# Patient Record
Sex: FEMALE | Race: WHITE | Employment: OTHER | URBAN - METROPOLITAN AREA
[De-identification: names, ages, dates, MRNs, and addresses within clinical notes are randomized per-mention and may not be internally consistent; named-entity substitution may affect disease eponyms.]

---

## 2017-02-14 RX ORDER — QUINAPRIL 10 MG/1
TABLET ORAL
Qty: 30 TABLET | Refills: 5 | Status: SHIPPED | OUTPATIENT
Start: 2017-02-14 | End: 2017-08-28

## 2017-02-14 RX ORDER — LEVOTHYROXINE SODIUM 100 MCG
TABLET ORAL
Qty: 30 TABLET | Refills: 1 | Status: SHIPPED | OUTPATIENT
Start: 2017-02-14 | End: 2017-04-14

## 2017-04-07 ENCOUNTER — OFFICE VISIT (OUTPATIENT)
Dept: FAMILY MEDICINE CLINIC | Facility: CLINIC | Age: 77
End: 2017-04-07

## 2017-04-07 ENCOUNTER — APPOINTMENT (OUTPATIENT)
Dept: LAB | Age: 77
End: 2017-04-07
Attending: FAMILY MEDICINE
Payer: MEDICARE

## 2017-04-07 VITALS
DIASTOLIC BLOOD PRESSURE: 81 MMHG | TEMPERATURE: 98 F | WEIGHT: 173 LBS | BODY MASS INDEX: 35 KG/M2 | HEART RATE: 74 BPM | SYSTOLIC BLOOD PRESSURE: 130 MMHG

## 2017-04-07 DIAGNOSIS — E03.8 HYPOTHYROIDISM DUE TO HASHIMOTO'S THYROIDITIS: ICD-10-CM

## 2017-04-07 DIAGNOSIS — E03.8 HYPOTHYROIDISM DUE TO HASHIMOTO'S THYROIDITIS: Primary | ICD-10-CM

## 2017-04-07 DIAGNOSIS — G89.29 CHRONIC BILATERAL LOW BACK PAIN WITHOUT SCIATICA: ICD-10-CM

## 2017-04-07 DIAGNOSIS — E06.3 HYPOTHYROIDISM DUE TO HASHIMOTO'S THYROIDITIS: ICD-10-CM

## 2017-04-07 DIAGNOSIS — E06.3 HYPOTHYROIDISM DUE TO HASHIMOTO'S THYROIDITIS: Primary | ICD-10-CM

## 2017-04-07 DIAGNOSIS — M54.50 CHRONIC BILATERAL LOW BACK PAIN WITHOUT SCIATICA: ICD-10-CM

## 2017-04-07 PROCEDURE — 99214 OFFICE O/P EST MOD 30 MIN: CPT | Performed by: FAMILY MEDICINE

## 2017-04-07 PROCEDURE — G0463 HOSPITAL OUTPT CLINIC VISIT: HCPCS | Performed by: FAMILY MEDICINE

## 2017-04-07 PROCEDURE — 84439 ASSAY OF FREE THYROXINE: CPT

## 2017-04-07 PROCEDURE — 36415 COLL VENOUS BLD VENIPUNCTURE: CPT

## 2017-04-07 PROCEDURE — 84443 ASSAY THYROID STIM HORMONE: CPT

## 2017-04-07 RX ORDER — OXYBUTYNIN CHLORIDE 10 MG/1
10 TABLET, EXTENDED RELEASE ORAL DAILY
Qty: 30 TABLET | Refills: 5 | Status: SHIPPED | OUTPATIENT
Start: 2017-04-07 | End: 2018-02-26

## 2017-04-07 NOTE — PROGRESS NOTES
HPI:    Patient ID: Yuliana Wyatt is a 78year old female. HPI  Patient presents with:  Thyroid Problem: f/u  Incontinence  Low back pain chronic  Review of Systems   Constitutional: Negative. Respiratory: Negative. Cardiovascular: Negative. Referrals:  None       NO#[id number]

## 2017-04-11 ENCOUNTER — TELEPHONE (OUTPATIENT)
Dept: FAMILY MEDICINE CLINIC | Facility: CLINIC | Age: 77
End: 2017-04-11

## 2017-04-11 NOTE — TELEPHONE ENCOUNTER
Dr. Keke Bains please call patient again, she states she was in the bathroom/shower, when you called

## 2017-04-12 NOTE — TELEPHONE ENCOUNTER
LMTCB please transfer to [phone] until 5 and [phone] after. Notes Recorded by ALKA Burdick on 4/11/2017 at 9:39 AM  Left message with patient regarding normal results and advised continue same dose of Synthroid.

## 2017-04-14 RX ORDER — LEVOTHYROXINE SODIUM 100 MCG
TABLET ORAL
Qty: 30 TABLET | Refills: 2 | Status: SHIPPED | OUTPATIENT
Start: 2017-04-14 | End: 2017-08-07

## 2017-04-14 NOTE — TELEPHONE ENCOUNTER
Rx request for Synthroid 100 mcg, PASSED Hypothyroid Protocol. Rx filled per protocol.     Hypothyroid Medications  Protocol Criteria:  Appointment scheduled in the past 12 months or the next 3 months  TSH resulted in the past 12 months that is normal  Rece

## 2017-05-10 ENCOUNTER — OFFICE VISIT (OUTPATIENT)
Dept: FAMILY MEDICINE CLINIC | Facility: CLINIC | Age: 77
End: 2017-05-10

## 2017-05-10 VITALS
SYSTOLIC BLOOD PRESSURE: 127 MMHG | RESPIRATION RATE: 16 BRPM | HEART RATE: 75 BPM | DIASTOLIC BLOOD PRESSURE: 81 MMHG | TEMPERATURE: 98 F

## 2017-05-10 DIAGNOSIS — J06.9 ACUTE URI: Primary | ICD-10-CM

## 2017-05-10 PROCEDURE — 99213 OFFICE O/P EST LOW 20 MIN: CPT | Performed by: FAMILY MEDICINE

## 2017-05-10 PROCEDURE — G0463 HOSPITAL OUTPT CLINIC VISIT: HCPCS | Performed by: FAMILY MEDICINE

## 2017-05-10 RX ORDER — AMOXICILLIN 875 MG/1
875 TABLET, COATED ORAL 2 TIMES DAILY
Qty: 20 TABLET | Refills: 0 | Status: SHIPPED | OUTPATIENT
Start: 2017-05-10 | End: 2017-06-26 | Stop reason: ALTCHOICE

## 2017-05-10 NOTE — PROGRESS NOTES
HPI:    Patient ID: Inocencio Vo is a 78year old female. HPI Comments: Pt presents with cold symptoms for 2-3 days. Pt has had cough, sore throat. No fevers. Pt has tried otc remedies without relief. Pt states no sick contacts.        Chest Congest canal normal.   Left Ear: Tympanic membrane and ear canal normal.   Mouth/Throat: Oropharynx is clear and moist.   Neck: Neck supple. Cardiovascular: Normal rate, regular rhythm, normal heart sounds and intact distal pulses.     Pulmonary/Chest: Effort no

## 2017-06-26 ENCOUNTER — TELEPHONE (OUTPATIENT)
Dept: INTERNAL MEDICINE CLINIC | Facility: CLINIC | Age: 77
End: 2017-06-26

## 2017-06-26 ENCOUNTER — OFFICE VISIT (OUTPATIENT)
Dept: FAMILY MEDICINE CLINIC | Facility: CLINIC | Age: 77
End: 2017-06-26

## 2017-06-26 VITALS
DIASTOLIC BLOOD PRESSURE: 81 MMHG | BODY MASS INDEX: 35 KG/M2 | WEIGHT: 173 LBS | SYSTOLIC BLOOD PRESSURE: 121 MMHG | TEMPERATURE: 98 F

## 2017-06-26 DIAGNOSIS — M17.0 PRIMARY OSTEOARTHRITIS OF BOTH KNEES: Primary | ICD-10-CM

## 2017-06-26 PROCEDURE — G0463 HOSPITAL OUTPT CLINIC VISIT: HCPCS | Performed by: FAMILY MEDICINE

## 2017-06-26 PROCEDURE — 99213 OFFICE O/P EST LOW 20 MIN: CPT | Performed by: FAMILY MEDICINE

## 2017-06-26 NOTE — PROGRESS NOTES
HPI:    Patient ID: Orion Howell is a 78year old female. HPI  Patient presents with:  Fall: pt rolled out of bed and fell on floor, c/o knee pain and back  Has two knee replacements the right one done six months ago.   Review of Systems   Constitut

## 2017-06-26 NOTE — TELEPHONE ENCOUNTER
Actions Requested: appt today fro eval.  Scheduled   Situation/Background   Problem: fell out of bed and had to put full pressure on knees ( new bilateral knee replacements< 6 months ago), denies LOC or hitting head   Onset: < 1hour ago   Associated Sympto

## 2017-08-07 ENCOUNTER — OFFICE VISIT (OUTPATIENT)
Dept: FAMILY MEDICINE CLINIC | Facility: CLINIC | Age: 77
End: 2017-08-07

## 2017-08-07 VITALS
WEIGHT: 173 LBS | BODY MASS INDEX: 35 KG/M2 | DIASTOLIC BLOOD PRESSURE: 84 MMHG | SYSTOLIC BLOOD PRESSURE: 136 MMHG | HEART RATE: 60 BPM

## 2017-08-07 DIAGNOSIS — E03.8 HYPOTHYROIDISM DUE TO HASHIMOTO'S THYROIDITIS: ICD-10-CM

## 2017-08-07 DIAGNOSIS — L84 PRE-ULCERATIVE CORN OR CALLOUS: Primary | ICD-10-CM

## 2017-08-07 DIAGNOSIS — N39.41 URGE INCONTINENCE OF URINE: ICD-10-CM

## 2017-08-07 DIAGNOSIS — R41.840 POOR CONCENTRATION: ICD-10-CM

## 2017-08-07 DIAGNOSIS — E06.3 HYPOTHYROIDISM DUE TO HASHIMOTO'S THYROIDITIS: ICD-10-CM

## 2017-08-07 PROCEDURE — G0463 HOSPITAL OUTPT CLINIC VISIT: HCPCS | Performed by: FAMILY MEDICINE

## 2017-08-07 PROCEDURE — 99214 OFFICE O/P EST MOD 30 MIN: CPT | Performed by: FAMILY MEDICINE

## 2017-08-07 RX ORDER — LEVOTHYROXINE SODIUM 0.1 MG/1
100 TABLET ORAL
Qty: 30 TABLET | Refills: 10 | Status: SHIPPED | OUTPATIENT
Start: 2017-08-07 | End: 2018-07-30

## 2017-08-07 NOTE — PROGRESS NOTES
HPI:    Patient ID: Fanny Pereyra is a 78year old female.     HPI  Patient presents with:  Callus: callus on right foot toe  Memory Loss: pt has concern on memory  Medication Problem: pt has questions on Oxybutynin medication    Review of Systems   Con she stopped in the past. Her memory is concerned for word searching at times. She is anxious and busy with multiple personal issues. I feel her concentration is poor due to this more than an element of dementia.     No orders of the defined types were place

## 2017-08-30 RX ORDER — QUINAPRIL 10 MG/1
TABLET ORAL
Qty: 30 TABLET | Refills: 2 | Status: SHIPPED | OUTPATIENT
Start: 2017-08-30 | End: 2017-12-14

## 2017-10-30 ENCOUNTER — NURSE TRIAGE (OUTPATIENT)
Dept: FAMILY MEDICINE CLINIC | Facility: CLINIC | Age: 77
End: 2017-10-30

## 2017-10-30 NOTE — TELEPHONE ENCOUNTER
Action Requested: Summary for Provider     []  Critical Lab, Recommendations Needed  [] Need Additional Advice  [x]   FYI    []   Need Orders  [] Need Medications Sent to Pharmacy  []  Other     SUMMARY: FYI, patient fell out of bed, no injury    Pt states

## 2017-10-30 NOTE — TELEPHONE ENCOUNTER
Patient returning a call and advised about Dr Priya Brannon understanding. Note      Noted. Follow up if keeps occurring.

## 2017-12-15 RX ORDER — QUINAPRIL 10 MG/1
TABLET ORAL
Qty: 30 TABLET | Refills: 2 | Status: SHIPPED | OUTPATIENT
Start: 2017-12-15 | End: 2018-02-26

## 2018-02-26 ENCOUNTER — OFFICE VISIT (OUTPATIENT)
Dept: FAMILY MEDICINE CLINIC | Facility: CLINIC | Age: 78
End: 2018-02-26

## 2018-02-26 ENCOUNTER — LAB ENCOUNTER (OUTPATIENT)
Dept: LAB | Age: 78
End: 2018-02-26
Attending: FAMILY MEDICINE
Payer: MEDICARE

## 2018-02-26 VITALS
HEIGHT: 60.5 IN | SYSTOLIC BLOOD PRESSURE: 118 MMHG | WEIGHT: 181.5 LBS | TEMPERATURE: 98 F | HEART RATE: 88 BPM | DIASTOLIC BLOOD PRESSURE: 83 MMHG | BODY MASS INDEX: 34.71 KG/M2

## 2018-02-26 DIAGNOSIS — F41.9 ANXIETY: ICD-10-CM

## 2018-02-26 DIAGNOSIS — I10 ESSENTIAL HYPERTENSION: Primary | ICD-10-CM

## 2018-02-26 DIAGNOSIS — Z23 NEED FOR VACCINATION: ICD-10-CM

## 2018-02-26 DIAGNOSIS — N39.46 MIXED STRESS AND URGE URINARY INCONTINENCE: ICD-10-CM

## 2018-02-26 DIAGNOSIS — M48.061 SPINAL STENOSIS OF LUMBAR REGION WITHOUT NEUROGENIC CLAUDICATION: ICD-10-CM

## 2018-02-26 DIAGNOSIS — M43.16 SPONDYLOLISTHESIS OF LUMBAR REGION: ICD-10-CM

## 2018-02-26 DIAGNOSIS — E07.9 THYROID DISEASE: ICD-10-CM

## 2018-02-26 DIAGNOSIS — Z00.00 ENCOUNTER FOR ANNUAL HEALTH EXAMINATION: ICD-10-CM

## 2018-02-26 DIAGNOSIS — I10 ESSENTIAL HYPERTENSION: ICD-10-CM

## 2018-02-26 PROBLEM — A04.8 H. PYLORI INFECTION: Status: RESOLVED | Noted: 2018-02-26 | Resolved: 2018-02-26

## 2018-02-26 PROBLEM — K63.5 COLON POLYP: Status: RESOLVED | Noted: 2018-02-26 | Resolved: 2018-02-26

## 2018-02-26 PROBLEM — M53.9 BACK PROBLEM: Status: RESOLVED | Noted: 2018-02-26 | Resolved: 2018-02-26

## 2018-02-26 PROBLEM — M54.9 BACK PAIN: Status: RESOLVED | Noted: 2018-02-26 | Resolved: 2018-02-26

## 2018-02-26 LAB
ALBUMIN SERPL BCP-MCNC: 4.1 G/DL (ref 3.5–4.8)
ALBUMIN/GLOB SERPL: 1.2 {RATIO} (ref 1–2)
ALP SERPL-CCNC: 100 U/L (ref 32–100)
ALT SERPL-CCNC: 23 U/L (ref 14–54)
ANION GAP SERPL CALC-SCNC: 11 MMOL/L (ref 0–18)
AST SERPL-CCNC: 29 U/L (ref 15–41)
BACTERIA UR QL AUTO: NEGATIVE /HPF
BASOPHILS # BLD: 0.1 K/UL (ref 0–0.2)
BASOPHILS NFR BLD: 1 %
BILIRUB SERPL-MCNC: 1 MG/DL (ref 0.3–1.2)
BILIRUB UR QL: NEGATIVE
BUN SERPL-MCNC: 11 MG/DL (ref 8–20)
BUN/CREAT SERPL: 14.7 (ref 10–20)
CALCIUM SERPL-MCNC: 9.7 MG/DL (ref 8.5–10.5)
CHLORIDE SERPL-SCNC: 103 MMOL/L (ref 95–110)
CHOLEST SERPL-MCNC: 206 MG/DL (ref 110–200)
CO2 SERPL-SCNC: 24 MMOL/L (ref 22–32)
COLOR UR: YELLOW
CREAT SERPL-MCNC: 0.75 MG/DL (ref 0.5–1.5)
EOSINOPHIL # BLD: 0.1 K/UL (ref 0–0.7)
EOSINOPHIL NFR BLD: 2 %
ERYTHROCYTE [DISTWIDTH] IN BLOOD BY AUTOMATED COUNT: 13.5 % (ref 11–15)
GLOBULIN PLAS-MCNC: 3.4 G/DL (ref 2.5–3.7)
GLUCOSE SERPL-MCNC: 148 MG/DL (ref 70–99)
GLUCOSE UR-MCNC: NEGATIVE MG/DL
HCT VFR BLD AUTO: 44.8 % (ref 35–48)
HDLC SERPL-MCNC: 53 MG/DL
HGB BLD-MCNC: 15.2 G/DL (ref 12–16)
KETONES UR-MCNC: NEGATIVE MG/DL
LDLC SERPL CALC-MCNC: 118 MG/DL (ref 0–99)
LYMPHOCYTES # BLD: 2 K/UL (ref 1–4)
LYMPHOCYTES NFR BLD: 25 %
MCH RBC QN AUTO: 31 PG (ref 27–32)
MCHC RBC AUTO-ENTMCNC: 34 G/DL (ref 32–37)
MCV RBC AUTO: 91.1 FL (ref 80–100)
MONOCYTES # BLD: 0.5 K/UL (ref 0–1)
MONOCYTES NFR BLD: 6 %
NEUTROPHILS # BLD AUTO: 5.5 K/UL (ref 1.8–7.7)
NEUTROPHILS NFR BLD: 67 %
NITRITE UR QL STRIP.AUTO: NEGATIVE
NONHDLC SERPL-MCNC: 153 MG/DL
OSMOLALITY UR CALC.SUM OF ELEC: 288 MOSM/KG (ref 275–295)
PATIENT FASTING: YES
PH UR: 5 [PH] (ref 5–8)
PLATELET # BLD AUTO: 224 K/UL (ref 140–400)
PMV BLD AUTO: 9 FL (ref 7.4–10.3)
POTASSIUM SERPL-SCNC: 4.2 MMOL/L (ref 3.3–5.1)
PROT SERPL-MCNC: 7.5 G/DL (ref 5.9–8.4)
PROT UR-MCNC: NEGATIVE MG/DL
RBC # BLD AUTO: 4.92 M/UL (ref 3.7–5.4)
RBC #/AREA URNS AUTO: 2 /HPF
SODIUM SERPL-SCNC: 138 MMOL/L (ref 136–144)
SP GR UR STRIP: 1.02 (ref 1–1.03)
TRIGL SERPL-MCNC: 175 MG/DL (ref 1–149)
TSH SERPL-ACNC: 2.39 UIU/ML (ref 0.45–5.33)
UROBILINOGEN UR STRIP-ACNC: 2
VIT C UR-MCNC: NEGATIVE MG/DL
WBC # BLD AUTO: 8.3 K/UL (ref 4–11)
WBC #/AREA URNS AUTO: 4 /HPF

## 2018-02-26 PROCEDURE — G0439 PPPS, SUBSEQ VISIT: HCPCS | Performed by: FAMILY MEDICINE

## 2018-02-26 PROCEDURE — G0009 ADMIN PNEUMOCOCCAL VACCINE: HCPCS | Performed by: FAMILY MEDICINE

## 2018-02-26 PROCEDURE — 84443 ASSAY THYROID STIM HORMONE: CPT

## 2018-02-26 PROCEDURE — 81001 URINALYSIS AUTO W/SCOPE: CPT

## 2018-02-26 PROCEDURE — 80053 COMPREHEN METABOLIC PANEL: CPT

## 2018-02-26 PROCEDURE — 36415 COLL VENOUS BLD VENIPUNCTURE: CPT

## 2018-02-26 PROCEDURE — 80061 LIPID PANEL: CPT

## 2018-02-26 PROCEDURE — 90732 PPSV23 VACC 2 YRS+ SUBQ/IM: CPT | Performed by: FAMILY MEDICINE

## 2018-02-26 PROCEDURE — 85025 COMPLETE CBC W/AUTO DIFF WBC: CPT

## 2018-02-26 RX ORDER — CITALOPRAM 10 MG/1
10 TABLET ORAL DAILY
Qty: 15 TABLET | Refills: 0 | Status: SHIPPED | OUTPATIENT
Start: 2018-02-26 | End: 2018-09-04 | Stop reason: ALTCHOICE

## 2018-02-26 RX ORDER — OXYBUTYNIN CHLORIDE 10 MG/1
10 TABLET, EXTENDED RELEASE ORAL DAILY
Qty: 30 TABLET | Refills: 11 | Status: SHIPPED | OUTPATIENT
Start: 2018-02-26 | End: 2019-02-28

## 2018-02-26 RX ORDER — QUINAPRIL 10 MG/1
10 TABLET ORAL
Qty: 30 TABLET | Refills: 11 | Status: SHIPPED | OUTPATIENT
Start: 2018-02-26 | End: 2019-02-28

## 2018-02-26 NOTE — PROGRESS NOTES
HPI:   Thuy Olmedo is a 78year old female who presents for a Medicare Subsequent Annual Wellness visit (Pt already had Initial Annual Wellness).     Patient presents with:  Physical: Medicare Physical      Her last annual assessment has been over 1 Smoker                                                              Packs/day: 0.00      Years: 0.00         Quit date: 7/29/1966  Smokeless tobacco: Never Used                             CAGE Alcohol screening   Butch Flow was screened for Alcohol Thyroid disease; and Ulcer of the stomach and intestine.     She  has a past surgical history that includes ; injection, anesthetic/steroid, transforaminal epidural; lumbar/sacral, single level (Bilateral, 2015); injection, anesthetic/steroid, tra or asthma     EXAM:   /83 (BP Location: Left arm, Patient Position: Sitting, Cuff Size: large)   Pulse 88   Temp 97.7 °F (36.5 °C) (Oral)   Ht 5' 0.5\" (1.537 m)   Wt 181 lb 8 oz (82.3 kg)   LMP 08/28/1970 (LMP Unknown)   BMI 34.86 kg/m²  Estimated b well-developed and well-nourished. HENT:   Head: Normocephalic and atraumatic.    Right Ear: Tympanic membrane normal.   Left Ear: Tympanic membrane normal.   Nose: Nose normal.   Eyes: Conjunctivae and EOM are normal. Pupils are equal, round, and reactiv exercise. Return in 1 year (on 2/26/2019).      Annia Martell DO, 2/26/2018     General Health     In the past six months, have you lost more than 10 pounds without trying?: 2 - No  Has your appetite been poor?: No  How does the patient maintain a goo Pap+HPV every 5 yrs age 33-67, age 72 and older at high risk There are no preventive care reminders to display for this patient.  Update Health Maintenance if applicable    Chlamydia  Annually if high risk No results found for: CHLAMYDIA No flowsheet data f CREATININE (P) (mg/dL)   Date Value   09/20/2016 0.74    No flowsheet data found. Drug Serum Conc  Annually No results found for: DIGOXIN, DIG, VALP No flowsheet data found.                Template: [facility] AMB MEDICARE ANNUAL ASSESSMENT FEMALE Luz Maria Bran

## 2018-02-26 NOTE — PATIENT INSTRUCTIONS
Juan Lao's SCREENING SCHEDULE   Tests on this list are recommended by your physician but may not be covered, or covered at this frequency, by your insurer. Please check with your insurance carrier before scheduling to verify coverage.    Kourtney Daugherty who meet one of the following criteria:   • Men who are 73-68 years old and have smoked more than 100 cigarettes in their lifetime   • Anyone with a family history    Colorectal Cancer Screening  Covered up to Age 76     Colonoscopy Screen   Covered every Immunizations      Influenza  Covered Annually No orders found for this or any previous visit. Please get every year    Pneumococcal 13 (Prevnar)  Covered Once after 65 No orders found for this or any previous visit.  Please get once after your 65th birth

## 2018-03-03 ENCOUNTER — APPOINTMENT (OUTPATIENT)
Dept: GENERAL RADIOLOGY | Facility: HOSPITAL | Age: 78
End: 2018-03-03
Attending: EMERGENCY MEDICINE
Payer: MEDICARE

## 2018-03-03 ENCOUNTER — HOSPITAL ENCOUNTER (OUTPATIENT)
Facility: HOSPITAL | Age: 78
Setting detail: OBSERVATION
LOS: 1 days | Discharge: HOME | End: 2018-03-04
Attending: EMERGENCY MEDICINE | Admitting: INTERNAL MEDICINE
Payer: MEDICARE

## 2018-03-03 ENCOUNTER — APPOINTMENT (OUTPATIENT)
Dept: CT IMAGING | Facility: HOSPITAL | Age: 78
End: 2018-03-03
Attending: EMERGENCY MEDICINE
Payer: MEDICARE

## 2018-03-03 DIAGNOSIS — R26.81 UNSTEADY GAIT: ICD-10-CM

## 2018-03-03 DIAGNOSIS — E86.0 DEHYDRATION: Primary | ICD-10-CM

## 2018-03-03 LAB
ALBUMIN SERPL-MCNC: 3.5 G/DL (ref 3.5–4.8)
ALP LIVER SERPL-CCNC: 121 U/L (ref 55–142)
ALT SERPL-CCNC: 23 U/L (ref 14–54)
AST SERPL-CCNC: 21 U/L (ref 15–41)
ATRIAL RATE: 71 BPM
BASOPHILS # BLD AUTO: 0.08 X10(3) UL (ref 0–0.1)
BASOPHILS NFR BLD AUTO: 1.1 %
BILIRUB SERPL-MCNC: 0.5 MG/DL (ref 0.1–2)
BILIRUB UR QL STRIP.AUTO: NEGATIVE
BUN BLD-MCNC: 16 MG/DL (ref 8–20)
CALCIUM BLD-MCNC: 9.4 MG/DL (ref 8.3–10.3)
CHLORIDE: 109 MMOL/L (ref 101–111)
CLARITY UR REFRACT.AUTO: CLEAR
CO2: 22 MMOL/L (ref 22–32)
CREAT BLD-MCNC: 0.81 MG/DL (ref 0.55–1.02)
EOSINOPHIL # BLD AUTO: 0.2 X10(3) UL (ref 0–0.3)
EOSINOPHIL NFR BLD AUTO: 2.6 %
ERYTHROCYTE [DISTWIDTH] IN BLOOD BY AUTOMATED COUNT: 12.9 % (ref 11.5–16)
GLUCOSE BLD-MCNC: 153 MG/DL (ref 70–99)
GLUCOSE BLD-MCNC: 157 MG/DL (ref 65–99)
GLUCOSE UR STRIP.AUTO-MCNC: NEGATIVE MG/DL
HCT VFR BLD AUTO: 43.5 % (ref 34–50)
HGB BLD-MCNC: 14.7 G/DL (ref 12–16)
IMMATURE GRANULOCYTE COUNT: 0.03 X10(3) UL (ref 0–1)
IMMATURE GRANULOCYTE RATIO %: 0.4 %
KETONES UR STRIP.AUTO-MCNC: NEGATIVE MG/DL
LEUKOCYTE ESTERASE UR QL STRIP.AUTO: NEGATIVE
LYMPHOCYTES # BLD AUTO: 2.04 X10(3) UL (ref 0.9–4)
LYMPHOCYTES NFR BLD AUTO: 26.8 %
M PROTEIN MFR SERPL ELPH: 7.5 G/DL (ref 6.1–8.3)
MCH RBC QN AUTO: 30.5 PG (ref 27–33.2)
MCHC RBC AUTO-ENTMCNC: 33.8 G/DL (ref 31–37)
MCV RBC AUTO: 90.2 FL (ref 81–100)
MONOCYTES # BLD AUTO: 0.65 X10(3) UL (ref 0.1–1)
MONOCYTES NFR BLD AUTO: 8.6 %
NEUTROPHIL ABS PRELIM: 4.6 X10 (3) UL (ref 1.3–6.7)
NEUTROPHILS # BLD AUTO: 4.6 X10(3) UL (ref 1.3–6.7)
NEUTROPHILS NFR BLD AUTO: 60.5 %
NITRITE UR QL STRIP.AUTO: NEGATIVE
P AXIS: 20 DEGREES
P-R INTERVAL: 180 MS
PH UR STRIP.AUTO: 6 [PH] (ref 4.5–8)
PLATELET # BLD AUTO: 198 10(3)UL (ref 150–450)
POTASSIUM SERPL-SCNC: 4.1 MMOL/L (ref 3.6–5.1)
PROT UR STRIP.AUTO-MCNC: NEGATIVE MG/DL
Q-T INTERVAL: 388 MS
QRS DURATION: 86 MS
QTC CALCULATION (BEZET): 421 MS
R AXIS: -40 DEGREES
RBC # BLD AUTO: 4.82 X10(6)UL (ref 3.8–5.1)
RBC UR QL AUTO: NEGATIVE
RED CELL DISTRIBUTION WIDTH-SD: 42.3 FL (ref 35.1–46.3)
SODIUM SERPL-SCNC: 141 MMOL/L (ref 136–144)
SP GR UR STRIP.AUTO: <1.005 (ref 1–1.03)
T AXIS: 40 DEGREES
TROPONIN: <0.046 NG/ML (ref ?–0.05)
UROBILINOGEN UR STRIP.AUTO-MCNC: <2 MG/DL
VENTRICULAR RATE: 71 BPM
WBC # BLD AUTO: 7.6 X10(3) UL (ref 4–13)

## 2018-03-03 PROCEDURE — 70450 CT HEAD/BRAIN W/O DYE: CPT | Performed by: EMERGENCY MEDICINE

## 2018-03-03 PROCEDURE — 71045 X-RAY EXAM CHEST 1 VIEW: CPT | Performed by: EMERGENCY MEDICINE

## 2018-03-03 PROCEDURE — 99219 INITIAL OBSERVATION CARE,LEVL II: CPT | Performed by: INTERNAL MEDICINE

## 2018-03-03 RX ORDER — HEPARIN SODIUM 5000 [USP'U]/ML
5000 INJECTION, SOLUTION INTRAVENOUS; SUBCUTANEOUS EVERY 8 HOURS SCHEDULED
Status: DISCONTINUED | OUTPATIENT
Start: 2018-03-03 | End: 2018-03-04

## 2018-03-03 RX ORDER — SODIUM CHLORIDE 9 MG/ML
125 INJECTION, SOLUTION INTRAVENOUS CONTINUOUS
Status: DISCONTINUED | OUTPATIENT
Start: 2018-03-03 | End: 2018-03-03

## 2018-03-03 RX ORDER — ESCITALOPRAM OXALATE 5 MG/1
5 TABLET ORAL DAILY
Status: DISCONTINUED | OUTPATIENT
Start: 2018-03-03 | End: 2018-03-04

## 2018-03-03 RX ORDER — OXYBUTYNIN CHLORIDE 10 MG/1
10 TABLET, EXTENDED RELEASE ORAL DAILY
Status: DISCONTINUED | OUTPATIENT
Start: 2018-03-03 | End: 2018-03-04

## 2018-03-03 RX ORDER — LISINOPRIL 10 MG/1
10 TABLET ORAL DAILY
Status: DISCONTINUED | OUTPATIENT
Start: 2018-03-03 | End: 2018-03-04

## 2018-03-03 RX ORDER — SODIUM CHLORIDE 9 MG/ML
INJECTION, SOLUTION INTRAVENOUS CONTINUOUS
Status: DISCONTINUED | OUTPATIENT
Start: 2018-03-03 | End: 2018-03-03

## 2018-03-03 RX ORDER — ACETAMINOPHEN 325 MG/1
650 TABLET ORAL EVERY 6 HOURS PRN
Status: DISCONTINUED | OUTPATIENT
Start: 2018-03-03 | End: 2018-03-04

## 2018-03-03 RX ORDER — LEVOTHYROXINE SODIUM 0.1 MG/1
100 TABLET ORAL
Status: DISCONTINUED | OUTPATIENT
Start: 2018-03-03 | End: 2018-03-04

## 2018-03-03 RX ORDER — ONDANSETRON 2 MG/ML
4 INJECTION INTRAMUSCULAR; INTRAVENOUS EVERY 6 HOURS PRN
Status: DISCONTINUED | OUTPATIENT
Start: 2018-03-03 | End: 2018-03-04

## 2018-03-03 RX ORDER — SODIUM CHLORIDE 9 MG/ML
INJECTION, SOLUTION INTRAVENOUS CONTINUOUS
Status: DISCONTINUED | OUTPATIENT
Start: 2018-03-03 | End: 2018-03-04

## 2018-03-03 NOTE — ED NOTES
Pt becomes tearful with this RN. Sts she has to sell her condo and she does not want to. She is having a lot of family stress with her daughters. Pt denies SI/HI. Emotional support provided.

## 2018-03-03 NOTE — ED NOTES
Food tray delivered to pt. Call for report.  RN not available and charge RN not available to take report

## 2018-03-03 NOTE — PLAN OF CARE
Patient admitted to room 424 per bed, see admission record. Oriented to room and surroundings, call light in reach.  Patient admitted with dizziness and not feeling well, she is stressed about cicumstances at home she needs to sell her home and is short on m

## 2018-03-03 NOTE — ED PROVIDER NOTES
Patient Seen in: [facility] Emergency Department    History   Patient presents with:  Dizziness (neurologic)    Stated Complaint: \"not feelin right\", dizziness    HPI    79-year-old female presents emergency room with chief complaint of dizziness. ANESTHETIC/STEROID, TRANSFORAMINAL * Bilateral      Comment: Procedure: TRANSFORAMINAL EPIDURAL STEROID                INJECTION;  Surgeon: Bentley Aguilar MD;                 Location: [facility]  12/14/2015: INJECTION, ANESTHETIC/STEROID, [place] Nena Reese dizziness  Other systems are as noted in HPI. Constitutional and vital signs reviewed. All other systems reviewed and negative except as noted above.     Physical Exam   ED Triage Vitals [03/03/18 0816]  BP: 150/79  Pulse: 71  Resp: 18  Temp: 98.1 °F following orders were created for panel order CBC WITH DIFFERENTIAL WITH PLATELET.   Procedure                               Abnormality         Status                     ---------                               -----------         ------

## 2018-03-03 NOTE — H&P
[facility] HOSPITALIST  History and Physical     Charlie Morrissey Patient Status:  Emergency    10/17/1940 MRN [id number]   Location [address] [facility] Attending Tera Cedeno, [address] [facility] [place] Day # 0 PCP Bren Ribeiro DO     Chief Complai [facility]  2015: INJECTION, ANESTHETIC/STEROID, TRANSFORAMINAL * Bilateral      Comment: Procedure: TRANSFORAMINAL EPIDURAL STEROID                INJECTION;  Surgeon: Bentley Aguilar MD;                 Location: [facility]  No date:  1 tablet (10 mg total) by mouth daily. Disp: 30 tablet Rfl: 11   Citalopram Hydrobromide 10 MG Oral Tab Take 1 tablet (10 mg total) by mouth daily.  Disp: 15 tablet Rfl: 0   Levothyroxine Sodium (SYNTHROID) 100 MCG Oral Tab Take 1 tablet (100 mcg total) by Clearance: 47.5 mL/min (based on SCr of 0.81 mg/dL). No results for input(s): PTP, INR in the last 72 hours. Recent Labs   Lab  03/03/18   0822   TROP  <0.046       Imaging: Imaging data reviewed in Epic. ASSESSMENT / PLAN:     1.  General Brendan German

## 2018-03-03 NOTE — ED INITIAL ASSESSMENT (HPI)
Pt to ED with c/o dizziness and headache that started when she woke up. Pt sts she felt ok before she went to bed. Denies blurry vision or N/T. Denies CP.

## 2018-03-03 NOTE — ED NOTES
Report to [employer]. [place] paged. Pt denies any further needs at this time.  No distress noted

## 2018-03-04 VITALS
WEIGHT: 170 LBS | HEART RATE: 66 BPM | BODY MASS INDEX: 31.28 KG/M2 | RESPIRATION RATE: 16 BRPM | SYSTOLIC BLOOD PRESSURE: 102 MMHG | DIASTOLIC BLOOD PRESSURE: 84 MMHG | TEMPERATURE: 98 F | HEIGHT: 62 IN | OXYGEN SATURATION: 96 %

## 2018-03-04 LAB
BUN BLD-MCNC: 15 MG/DL (ref 8–20)
CALCIUM BLD-MCNC: 8.7 MG/DL (ref 8.3–10.3)
CHLORIDE: 113 MMOL/L (ref 101–111)
CO2: 18 MMOL/L (ref 22–32)
CREAT BLD-MCNC: 0.69 MG/DL (ref 0.55–1.02)
GLUCOSE BLD-MCNC: 144 MG/DL (ref 70–99)
POTASSIUM SERPL-SCNC: 4.4 MMOL/L (ref 3.6–5.1)
SODIUM SERPL-SCNC: 139 MMOL/L (ref 136–144)

## 2018-03-04 PROCEDURE — 99217 OBSERVATION CARE DISCHARGE: CPT | Performed by: HOSPITALIST

## 2018-03-04 NOTE — HOME CARE LIAISON
REFERRAL RECEIVED FROM [facility]. WILL MEET WITH PATIENT AND DISCUSS HH SERVICES.  WILL UPDATE CM/SW WITH DC PLAN FOR [facility]

## 2018-03-04 NOTE — PLAN OF CARE
Patient was seen by hospitalist and was discharged. Patient denies any pain, and has been cooperative with staff, and has made appropriate conversation with staff members during shift. On room air, and vitals stable, patient  will follow up with primary. Pa

## 2018-03-04 NOTE — CM/SW NOTE
Patient failed inpatient criteria. Second level of review completed and supports observation. UR committee in agreement. Discussed with Dr. Giovanna Carroll  who approves observation status. MOON given to the patient and order written.
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
## 2018-03-04 NOTE — PLAN OF CARE
Pt alert and oriented x4. VSS and afebrile. Denies pain. Up with standby assist, free from falls and injury. Pt stable on feet. No acute events overnight. Fall precautions maintained. Call light within reach. Will continue to monitor.

## 2018-03-05 ENCOUNTER — PATIENT OUTREACH (OUTPATIENT)
Dept: CASE MANAGEMENT | Age: 78
End: 2018-03-05

## 2018-03-05 NOTE — DISCHARGE SUMMARY
[facility] HOSPITALIST  DISCHARGE SUMMARY     Jeremi Dior Patient Status:  Observation    10/17/1940 MRN [id number]   [facility]NW-A Attending No att. providers found   Hosp Day # 1 PCP Cindy Patel DO     Date of Admission: 3/3/201 findings and recommendations (brief descriptions):  • none    Lab/Test results pending at Discharge:   · none    Consultants:  • none    Discharge Medication List:     Discharge Medications      CONTINUE taking these medications      Instructions Prescript 3/5/2018    Time spent:  > 30 minutes

## 2018-03-06 ENCOUNTER — TELEPHONE (OUTPATIENT)
Dept: FAMILY MEDICINE CLINIC | Facility: CLINIC | Age: 78
End: 2018-03-06

## 2018-03-06 ENCOUNTER — SNF/IP PROF CHARGE ONLY (OUTPATIENT)
Dept: FAMILY MEDICINE CLINIC | Facility: CLINIC | Age: 78
End: 2018-03-06

## 2018-03-06 DIAGNOSIS — F41.9 ANXIETY: ICD-10-CM

## 2018-03-06 DIAGNOSIS — R53.1 GENERAL WEAKNESS: Primary | ICD-10-CM

## 2018-03-06 DIAGNOSIS — I10 BENIGN ESSENTIAL HTN: ICD-10-CM

## 2018-03-06 DIAGNOSIS — M19.91 PRIMARY OSTEOARTHRITIS, UNSPECIFIED SITE: ICD-10-CM

## 2018-03-06 PROCEDURE — G0180 MD CERTIFICATION HHA PATIENT: HCPCS | Performed by: FAMILY MEDICINE

## 2018-03-06 NOTE — PROGRESS NOTES
[facility] [phone], [facility] contact information provided.

## 2018-03-06 NOTE — TELEPHONE ENCOUNTER
Christy ramos from [facility] wanting to let Dr Benton Morris know that pt has been discharged from the hospital for dehydration. Pt is being admitted to home health care for PT, social work and nursing.   Ella Cruz is looking for verbal order Please ad

## 2018-03-08 ENCOUNTER — TELEPHONE (OUTPATIENT)
Dept: FAMILY MEDICINE CLINIC | Facility: CLINIC | Age: 78
End: 2018-03-08

## 2018-03-08 NOTE — TELEPHONE ENCOUNTER
Supriya/[facility] states went to pt's home today to do PT evaluation      pt does not meet criteria for home health services- is not home bound    Will not be able to provide home PT or nursing services

## 2018-03-10 ENCOUNTER — TELEPHONE (OUTPATIENT)
Dept: FAMILY MEDICINE CLINIC | Facility: CLINIC | Age: 78
End: 2018-03-10

## 2018-03-10 DIAGNOSIS — R73.03 PRE-DIABETES: Primary | ICD-10-CM

## 2018-03-10 DIAGNOSIS — N39.0 URINARY TRACT INFECTION WITHOUT HEMATURIA, SITE UNSPECIFIED: Primary | ICD-10-CM

## 2018-03-10 NOTE — TELEPHONE ENCOUNTER
----- Message from Campbell Aaron DO sent at 3/10/2018  8:03 AM CST -----  Please call patient and inform that the laboratory results indicated possibility of diabetes and UTI. Need follow up A1C performed.   If she has s/s of UTI then start macrobid bid

## 2018-03-10 NOTE — TELEPHONE ENCOUNTER
Spoke with pt informed test results per Dr Castellanos Bookbinder. Pt states she is having urgency and frequency. Stressed importance of staying hydrated. Pt will have HgbA1c done. Pended Macrobid for sign off. Pharmacy verified.  Sent to [employer] and earlene

## 2018-03-13 ENCOUNTER — OFFICE VISIT (OUTPATIENT)
Dept: FAMILY MEDICINE CLINIC | Facility: CLINIC | Age: 78
End: 2018-03-13

## 2018-03-13 ENCOUNTER — APPOINTMENT (OUTPATIENT)
Dept: LAB | Age: 78
End: 2018-03-13
Attending: FAMILY MEDICINE
Payer: MEDICARE

## 2018-03-13 VITALS
HEART RATE: 64 BPM | TEMPERATURE: 98 F | DIASTOLIC BLOOD PRESSURE: 83 MMHG | HEIGHT: 60 IN | BODY MASS INDEX: 35.53 KG/M2 | WEIGHT: 181 LBS | SYSTOLIC BLOOD PRESSURE: 143 MMHG

## 2018-03-13 DIAGNOSIS — E86.0 DEHYDRATION: ICD-10-CM

## 2018-03-13 DIAGNOSIS — R53.1 GENERAL WEAKNESS: ICD-10-CM

## 2018-03-13 DIAGNOSIS — R26.81 UNSTEADY GAIT: Primary | ICD-10-CM

## 2018-03-13 DIAGNOSIS — R73.03 PRE-DIABETES: ICD-10-CM

## 2018-03-13 PROCEDURE — 83036 HEMOGLOBIN GLYCOSYLATED A1C: CPT

## 2018-03-13 PROCEDURE — 1111F DSCHRG MED/CURRENT MED MERGE: CPT | Performed by: FAMILY MEDICINE

## 2018-03-13 PROCEDURE — 99495 TRANSJ CARE MGMT MOD F2F 14D: CPT | Performed by: FAMILY MEDICINE

## 2018-03-13 PROCEDURE — 36415 COLL VENOUS BLD VENIPUNCTURE: CPT

## 2018-03-13 NOTE — PROGRESS NOTES
Several attempts made to reach the patient with no return call. Patient completed HFU on 3/13/2018. Closing encounter.

## 2018-03-13 NOTE — PROGRESS NOTES
HPI:    Roland Hercules is a 78year old female here today for hospital follow up.    She was discharged from Observation status in a hospital, . to Home   Admission Date: 3/3/18   Discharge Date: 3/4/18  Hospital Discharge Diagnoses (since 2/11/2018) Hydrobromide 10 MG Oral Tab Take 1 tablet (10 mg total) by mouth daily. No current facility-administered medications on file prior to visit.        HISTORY: reconciled and review with patient  She  has a past medical history of Back pain; Back problem; PHYSICAL EXAM:   Patient's last menstrual period was 08/28/1970 (lmp unknown). Estimated body mass index is 35.35 kg/m² as calculated from the following:    Height as of this encounter: 5' (1.524 m). Weight as of this encounter: 181 lb (82.1 kg).

## 2018-03-14 LAB — HBA1C MFR BLD: 7.7 % (ref 4–6)

## 2018-03-22 ENCOUNTER — TELEPHONE (OUTPATIENT)
Dept: OTHER | Age: 78
End: 2018-03-22

## 2018-03-22 NOTE — TELEPHONE ENCOUNTER
Spoke with patient and made her aware of her hemoglobin A1c result and that she needs to schedule a follow-up appointment with Dr. Aleshia Berman.  Patient voiced understanding and stated she will return call to clinic at another time to schedule the appointment b

## 2018-03-27 ENCOUNTER — TELEPHONE (OUTPATIENT)
Dept: OTHER | Age: 78
End: 2018-03-27

## 2018-03-27 ENCOUNTER — OFFICE VISIT (OUTPATIENT)
Dept: FAMILY MEDICINE CLINIC | Facility: CLINIC | Age: 78
End: 2018-03-27

## 2018-03-27 VITALS
WEIGHT: 183 LBS | SYSTOLIC BLOOD PRESSURE: 119 MMHG | DIASTOLIC BLOOD PRESSURE: 67 MMHG | BODY MASS INDEX: 36 KG/M2 | TEMPERATURE: 98 F

## 2018-03-27 DIAGNOSIS — E11.65 UNCONTROLLED TYPE 2 DIABETES MELLITUS WITH HYPERGLYCEMIA, WITHOUT LONG-TERM CURRENT USE OF INSULIN (HCC): Primary | ICD-10-CM

## 2018-03-27 PROCEDURE — 99214 OFFICE O/P EST MOD 30 MIN: CPT | Performed by: FAMILY MEDICINE

## 2018-03-27 PROCEDURE — G0463 HOSPITAL OUTPT CLINIC VISIT: HCPCS | Performed by: FAMILY MEDICINE

## 2018-03-27 NOTE — TELEPHONE ENCOUNTER
Office Visit  Open      3/27/2018  [address], [address] [place], [place]   Family Medicine   Uncontrolled type 2 diabetes mellitus with hyperglycemia, without long-term current use of insulin [facility])   Dx   Diabetes    Reason fo

## 2018-03-27 NOTE — PROGRESS NOTES
HPI:    Patient ID: Cherry Sales is a 78year old female. HPI  Patient presents with:  Diabetes: f/u A1C done 3/13/18    Review of Systems   Constitutional: Negative. Neurological: Negative for dizziness, weakness, numbness and headaches.

## 2018-03-27 NOTE — TELEPHONE ENCOUNTER
Patient returning a edie, advised that there is no encounter or information about the earlier's call.

## 2018-04-04 ENCOUNTER — TELEPHONE (OUTPATIENT)
Dept: ENDOCRINOLOGY CLINIC | Facility: CLINIC | Age: 78
End: 2018-04-04

## 2018-04-04 DIAGNOSIS — E11.9 TYPE 2 DIABETES MELLITUS WITHOUT COMPLICATION, WITHOUT LONG-TERM CURRENT USE OF INSULIN (HCC): Primary | ICD-10-CM

## 2018-04-04 NOTE — TELEPHONE ENCOUNTER
Pt is scheduled for diabetes education. I have pended an order for these visits. Please sign the order and associate a dx.  Thank you for the referral.

## 2018-04-09 ENCOUNTER — DIABETIC EDUCATION (OUTPATIENT)
Dept: ENDOCRINOLOGY CLINIC | Facility: CLINIC | Age: 78
End: 2018-04-09

## 2018-04-09 VITALS — WEIGHT: 182.19 LBS | BODY MASS INDEX: 36 KG/M2

## 2018-04-09 DIAGNOSIS — E11.9 TYPE 2 DIABETES MELLITUS WITHOUT COMPLICATION, WITHOUT LONG-TERM CURRENT USE OF INSULIN (HCC): Primary | ICD-10-CM

## 2018-04-09 PROCEDURE — G0108 DIAB MANAGE TRN  PER INDIV: HCPCS | Performed by: DIETITIAN, REGISTERED

## 2018-04-09 RX ORDER — LANCETS 33 GAUGE
1 EACH MISCELLANEOUS 2 TIMES DAILY
Qty: 1 BOX | Refills: 1 | Status: SHIPPED | OUTPATIENT
Start: 2018-04-09 | End: 2019-04-09

## 2018-04-09 RX ORDER — BLOOD SUGAR DIAGNOSTIC
STRIP MISCELLANEOUS
Qty: 200 STRIP | Refills: 1 | Status: SHIPPED | OUTPATIENT
Start: 2018-04-09 | End: 2019-04-09

## 2018-04-09 NOTE — PROGRESS NOTES
Shikha Oneill  : 10/17/1940 attended Step 1 Diabetic Education:    Date: 2018   Start time: 11:00 End time: 12:00    Wt 182 lb 3.2 oz   LMP 1970 (LMP Unknown)   BMI 35.58 kg/m²       GLYCOHEMOGLOBIN (HgA1c) (L) (%)   Date Value

## 2018-04-10 ENCOUNTER — DIABETIC EDUCATION (OUTPATIENT)
Dept: ENDOCRINOLOGY CLINIC | Facility: CLINIC | Age: 78
End: 2018-04-10

## 2018-04-10 NOTE — PROGRESS NOTES
Pt having trouble operating BG meter and lancing device. Reviewed with her: BG was 189 and then 162. Pt will return in 2 days for review again. She will review booklet with pictures and will try on her own tomorrow. No Charge.   Murphy Aguiar MS,

## 2018-04-12 ENCOUNTER — DIABETIC EDUCATION (OUTPATIENT)
Dept: ENDOCRINOLOGY CLINIC | Facility: CLINIC | Age: 78
End: 2018-04-12

## 2018-04-12 DIAGNOSIS — E11.9 TYPE 2 DIABETES MELLITUS WITHOUT COMPLICATION, WITHOUT LONG-TERM CURRENT USE OF INSULIN (HCC): Primary | ICD-10-CM

## 2018-04-12 NOTE — PROGRESS NOTES
Pt having difficulty learning how to test BG. Is able to do it with much direction and encouragement. Have seen her 3 x's this week for this. A friend will help her tomorrow and her daughter has returned from [facility] who will help her.   She is attending class

## 2018-04-19 ENCOUNTER — DIABETIC EDUCATION (OUTPATIENT)
Dept: ENDOCRINOLOGY CLINIC | Facility: CLINIC | Age: 78
End: 2018-04-19

## 2018-04-19 ENCOUNTER — TELEPHONE (OUTPATIENT)
Dept: ENDOCRINOLOGY CLINIC | Facility: CLINIC | Age: 78
End: 2018-04-19

## 2018-04-19 DIAGNOSIS — E11.9 TYPE 2 DIABETES MELLITUS WITHOUT COMPLICATION, WITHOUT LONG-TERM CURRENT USE OF INSULIN (HCC): Primary | ICD-10-CM

## 2018-04-19 PROCEDURE — G0109 DIAB MANAGE TRN IND/GROUP: HCPCS | Performed by: DIETITIAN, REGISTERED

## 2018-04-19 NOTE — PROGRESS NOTES
Jay Pride  [id number] attended Step 2 Diabetic Education:    Date: 4/19/2018  Start time: 2:00 End time: 4:00      Diabetes Overview, pathophysiology, pre-diabetes, A1C results and treatment options for diabetes self-management, types of diabete

## 2018-04-26 ENCOUNTER — NURSE ONLY (OUTPATIENT)
Dept: ENDOCRINOLOGY CLINIC | Facility: CLINIC | Age: 78
End: 2018-04-26

## 2018-04-26 DIAGNOSIS — E11.9 TYPE 2 DIABETES MELLITUS WITHOUT COMPLICATION, WITHOUT LONG-TERM CURRENT USE OF INSULIN (HCC): Primary | ICD-10-CM

## 2018-04-26 PROCEDURE — G0109 DIAB MANAGE TRN IND/GROUP: HCPCS

## 2018-04-26 NOTE — PROGRESS NOTES
Alanis Cardona  [id number] attended Step 3 Diabetic Education:    Date: 4/26/2018  Start time: 2pm   End time: 4pm          Prevention, detection and treatment of chronic complications  Reviewed how to reduce risks of complications including eye, katie

## 2018-05-03 ENCOUNTER — NURSE TRIAGE (OUTPATIENT)
Dept: OTHER | Age: 78
End: 2018-05-03

## 2018-05-03 NOTE — TELEPHONE ENCOUNTER
Action Requested: Summary for Provider     []  Critical Lab, Recommendations Needed  [] Need Additional Advice  []   FYI    []   Need Orders  [] Need Medications Sent to Pharmacy  []  Other     SUMMARY:   Dr. Song Exon do you want a well check on the patient have a fever?: No  What is the date of your last medical exam?: 03/13/18  Additional Assessments  Associated Symptoms: anxiety  Are these symptoms new, recurrent, or chronic?: new           Reason for Disposition  • Patient sounds very upset or troubled to

## 2018-05-03 NOTE — TELEPHONE ENCOUNTER
I spoke to the patient again. She stated she feels fine now. She has drinking fluids and voided just recently. Patient stated is sitting outside enjoy her birds. She does not feel hot.    She stated she will go to her daughters tonight because her ap

## 2018-05-08 ENCOUNTER — DIABETIC EDUCATION (OUTPATIENT)
Dept: ENDOCRINOLOGY CLINIC | Facility: CLINIC | Age: 78
End: 2018-05-08

## 2018-05-08 VITALS — WEIGHT: 178.19 LBS | BODY MASS INDEX: 35 KG/M2

## 2018-05-08 PROCEDURE — G0108 DIAB MANAGE TRN  PER INDIV: HCPCS | Performed by: DIETITIAN, REGISTERED

## 2018-05-08 NOTE — PROGRESS NOTES
Roland Hercules  :10/17/1940 attended Step 4 Diabetic Education:    Date: 2018  Start time: 12:30 End time: 1:00      Wt 178 lb 3.2 oz   LMP 1970 (LMP Unknown)   BMI 34.80 kg/m²   Weight Change:  Down 4#      GLYCOHEMOGLOBIN (HgA1c) (L) (%)

## 2018-05-10 ENCOUNTER — TELEPHONE (OUTPATIENT)
Dept: FAMILY MEDICINE CLINIC | Facility: CLINIC | Age: 78
End: 2018-05-10
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
## 2018-05-15 ENCOUNTER — OFFICE VISIT (OUTPATIENT)
Dept: FAMILY MEDICINE CLINIC | Facility: CLINIC | Age: 78
End: 2018-05-15

## 2018-05-15 VITALS
WEIGHT: 179 LBS | BODY MASS INDEX: 35 KG/M2 | HEART RATE: 66 BPM | TEMPERATURE: 98 F | SYSTOLIC BLOOD PRESSURE: 121 MMHG | DIASTOLIC BLOOD PRESSURE: 72 MMHG

## 2018-05-15 DIAGNOSIS — S09.90XD INJURY OF HEAD, SUBSEQUENT ENCOUNTER: Primary | ICD-10-CM

## 2018-05-15 PROCEDURE — 99214 OFFICE O/P EST MOD 30 MIN: CPT | Performed by: FAMILY MEDICINE

## 2018-05-15 PROCEDURE — G0463 HOSPITAL OUTPT CLINIC VISIT: HCPCS | Performed by: FAMILY MEDICINE

## 2018-05-15 RX ORDER — CYCLOBENZAPRINE HCL 5 MG
5 TABLET ORAL NIGHTLY
Qty: 7 TABLET | Refills: 0 | Status: SHIPPED | OUTPATIENT
Start: 2018-05-15 | End: 2018-05-30 | Stop reason: ALTCHOICE
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
## 2018-05-21 ENCOUNTER — PATIENT OUTREACH (OUTPATIENT)
Dept: CASE MANAGEMENT | Age: 78
End: 2018-05-21

## 2018-05-24 ENCOUNTER — TELEPHONE (OUTPATIENT)
Dept: INTERNAL MEDICINE CLINIC | Facility: CLINIC | Age: 78
End: 2018-05-24

## 2018-05-24 NOTE — TELEPHONE ENCOUNTER
Patient called to let us know that she had to cancel tomorrow's appointment because she is in therapy at this time and cannot make it. She will call us back when she's through with therapy.

## 2018-05-30 ENCOUNTER — OFFICE VISIT (OUTPATIENT)
Dept: FAMILY MEDICINE CLINIC | Facility: CLINIC | Age: 78
End: 2018-05-30

## 2018-05-30 VITALS
HEART RATE: 83 BPM | SYSTOLIC BLOOD PRESSURE: 104 MMHG | DIASTOLIC BLOOD PRESSURE: 69 MMHG | BODY MASS INDEX: 35 KG/M2 | WEIGHT: 177 LBS | TEMPERATURE: 98 F

## 2018-05-30 DIAGNOSIS — K59.00 CONSTIPATION, UNSPECIFIED CONSTIPATION TYPE: Primary | ICD-10-CM

## 2018-05-30 PROCEDURE — G0463 HOSPITAL OUTPT CLINIC VISIT: HCPCS | Performed by: PHYSICIAN ASSISTANT

## 2018-05-30 PROCEDURE — 99213 OFFICE O/P EST LOW 20 MIN: CPT | Performed by: PHYSICIAN ASSISTANT

## 2018-05-30 RX ORDER — POLYETHYLENE GLYCOL 3350 17 G/17G
17 POWDER, FOR SOLUTION ORAL DAILY
Qty: 30 EACH | Refills: 1 | Status: SHIPPED | OUTPATIENT
Start: 2018-05-30 | End: 2019-02-04 | Stop reason: CLARIF

## 2018-05-30 NOTE — PROGRESS NOTES
HPI:    Patient ID: Lakesha Martin is a 78year old female. Patient presents today for constipation for past three days. She has only had very small bowel movements in past three days.   She states usually has normal bowel movement daily without stra Levothyroxine Sodium (SYNTHROID) 100 MCG Oral Tab Take 1 tablet (100 mcg total) by mouth once daily. Disp: 30 tablet Rfl: 10   Cholecalciferol (VITAMIN D) 2000 UNITS Oral Cap Take 2,000 Units by mouth daily.  Disp:  Rfl:    acetaminophen (TYLENOL) 325 MG

## 2018-07-30 RX ORDER — LEVOTHYROXINE SODIUM 0.1 MG/1
100 TABLET ORAL
Qty: 90 TABLET | Refills: 0 | Status: SHIPPED | OUTPATIENT
Start: 2018-07-30 | End: 2018-10-31

## 2018-07-30 NOTE — TELEPHONE ENCOUNTER
•  Levothyroxine Sodium (SYNTHROID) 100 MCG Oral Tab, Take 1 tablet (100 mcg total) by mouth once daily. , Disp: 30 tablet, Rfl: 10  •

## 2018-07-31 NOTE — TELEPHONE ENCOUNTER
Hypothyroid Medications  Protocol Criteria:  Appointment scheduled in the past 12 months or the next 3 months  TSH resulted in the past 12 months that is normal  Recent Outpatient Visits            2 months ago Constipation, unspecified constipation type

## 2018-08-08 ENCOUNTER — TELEPHONE (OUTPATIENT)
Dept: FAMILY MEDICINE CLINIC | Facility: CLINIC | Age: 78
End: 2018-08-08

## 2018-08-08 DIAGNOSIS — E11.9 TYPE 2 DIABETES MELLITUS WITHOUT COMPLICATION, WITHOUT LONG-TERM CURRENT USE OF INSULIN (HCC): Primary | ICD-10-CM

## 2018-09-04 ENCOUNTER — APPOINTMENT (OUTPATIENT)
Dept: LAB | Facility: HOSPITAL | Age: 78
End: 2018-09-04
Attending: FAMILY MEDICINE
Payer: MEDICARE

## 2018-09-04 ENCOUNTER — OFFICE VISIT (OUTPATIENT)
Dept: FAMILY MEDICINE CLINIC | Facility: CLINIC | Age: 78
End: 2018-09-04
Payer: MEDICARE

## 2018-09-04 VITALS
BODY MASS INDEX: 35 KG/M2 | HEART RATE: 69 BPM | DIASTOLIC BLOOD PRESSURE: 73 MMHG | WEIGHT: 177 LBS | SYSTOLIC BLOOD PRESSURE: 123 MMHG | TEMPERATURE: 98 F

## 2018-09-04 DIAGNOSIS — S09.90XD INJURY OF HEAD, SUBSEQUENT ENCOUNTER: Primary | ICD-10-CM

## 2018-09-04 DIAGNOSIS — E11.9 TYPE 2 DIABETES MELLITUS WITHOUT COMPLICATION, WITHOUT LONG-TERM CURRENT USE OF INSULIN (HCC): ICD-10-CM

## 2018-09-04 DIAGNOSIS — M99.01 CERVICAL (NECK) REGION SOMATIC DYSFUNCTION: ICD-10-CM

## 2018-09-04 LAB
CREAT UR-MCNC: 63.3 MG/DL
MICROALBUMIN UR-MCNC: 0.4 MG/DL (ref 0–1.8)
MICROALBUMIN/CREAT UR: 6.3 MG/G{CREAT} (ref 0–20)

## 2018-09-04 PROCEDURE — 99214 OFFICE O/P EST MOD 30 MIN: CPT | Performed by: FAMILY MEDICINE

## 2018-09-04 PROCEDURE — 83036 HEMOGLOBIN GLYCOSYLATED A1C: CPT

## 2018-09-04 PROCEDURE — 36415 COLL VENOUS BLD VENIPUNCTURE: CPT

## 2018-09-04 PROCEDURE — 82043 UR ALBUMIN QUANTITATIVE: CPT

## 2018-09-04 PROCEDURE — G0463 HOSPITAL OUTPT CLINIC VISIT: HCPCS | Performed by: FAMILY MEDICINE

## 2018-09-04 PROCEDURE — 82570 ASSAY OF URINE CREATININE: CPT
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
## 2018-09-05 LAB — HBA1C MFR BLD: 6.8 % (ref 4–6)

## 2018-10-19 RX ORDER — NITROFURANTOIN 25; 75 MG/1; MG/1
100 CAPSULE ORAL 2 TIMES DAILY
Qty: 14 CAPSULE | Refills: 0 | OUTPATIENT
Start: 2018-10-19

## 2018-10-31 NOTE — TELEPHONE ENCOUNTER
Pharmacy/ [facility] is calling to request a refill on :      Current Outpatient Medications:  Levothyroxine Sodium (SYNTHROID) 100 MCG Oral Tab Take 1 tablet (100 mcg total) by mouth once daily.  Disp: 90 tablet Rfl: 0

## 2018-11-01 RX ORDER — LEVOTHYROXINE SODIUM 0.1 MG/1
100 TABLET ORAL
Qty: 90 TABLET | Refills: 0 | Status: SHIPPED | OUTPATIENT
Start: 2018-11-01 | End: 2019-01-17

## 2018-11-01 NOTE — TELEPHONE ENCOUNTER
Refill passed per [facility] protocol.   Hypothyroid Medications  Protocol Criteria:  Appointment scheduled in the past 12 months or the next 3 months  TSH resulted in the past 12 months that is normal  Recent Outpatient Visits            1 month ago Injury of head, subsequent encounter    [facility] Poster, [address] [facility]    Office Visit    5 months ago Constipation, unspecified constipation type    [facility] Poster, [address] [place], [place]    Office Visit    5 months ago Injury of head, subsequent encounter    [facility]ii Poster, [address] [place], [place]    Office Visit    6 months ago Type 2 diabetes mellitus without complication, without long-term current use of insulin ([facility]    [address] [place] [address], [address] [facility], RN, CDE    Nurse Only    7 months ago Uncontrolled type 2 diabetes mellitus with hyperglycemia, without long-term current use of insulin [facility])    Danii Poster, [address] [place], [place]    Office Visit          Lab Results   Component Value Date    TSH 2.39 02/26/2018

## 2019-01-11 ENCOUNTER — NURSE TRIAGE (OUTPATIENT)
Dept: OTHER | Age: 79
End: 2019-01-11

## 2019-01-11 NOTE — TELEPHONE ENCOUNTER
Patient called back to schedule an appointment with Dr Eva Felder next week. Patient indicated that was not able to get a hold of her daughter and again declining to go to the ER. Patient made appointment for 1/14/19 at 10:15am with Dr Eva Felder at [place].  Pa

## 2019-01-11 NOTE — TELEPHONE ENCOUNTER
Action Requested: Summary for Provider     []  Critical Lab, Recommendations Needed  [] Need Additional Advice  []   FYI    []   Need Orders  [] Need Medications Sent to Pharmacy  []  Other     SUMMARY:    Our protocol stated to go to the  ED the patient d

## 2019-01-14 ENCOUNTER — OFFICE VISIT (OUTPATIENT)
Dept: FAMILY MEDICINE CLINIC | Facility: CLINIC | Age: 79
End: 2019-01-14
Payer: MEDICARE

## 2019-01-14 VITALS
SYSTOLIC BLOOD PRESSURE: 150 MMHG | TEMPERATURE: 98 F | WEIGHT: 173.38 LBS | BODY MASS INDEX: 28.89 KG/M2 | DIASTOLIC BLOOD PRESSURE: 86 MMHG | HEIGHT: 65 IN | HEART RATE: 74 BPM

## 2019-01-14 DIAGNOSIS — M54.50 LUMBAR BACK PAIN: ICD-10-CM

## 2019-01-14 DIAGNOSIS — J02.9 PHARYNGITIS, UNSPECIFIED ETIOLOGY: ICD-10-CM

## 2019-01-14 DIAGNOSIS — I10 BENIGN ESSENTIAL HTN: ICD-10-CM

## 2019-01-14 DIAGNOSIS — M43.16 SPONDYLOLISTHESIS OF LUMBAR REGION: Primary | ICD-10-CM

## 2019-01-14 PROCEDURE — 99213 OFFICE O/P EST LOW 20 MIN: CPT | Performed by: FAMILY MEDICINE

## 2019-01-14 PROCEDURE — G0463 HOSPITAL OUTPT CLINIC VISIT: HCPCS | Performed by: FAMILY MEDICINE
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
## 2019-01-17 RX ORDER — LEVOTHYROXINE SODIUM 0.1 MG/1
100 TABLET ORAL
Qty: 90 TABLET | Refills: 0 | Status: SHIPPED | OUTPATIENT
Start: 2019-01-17 | End: 2019-04-01

## 2019-01-17 NOTE — TELEPHONE ENCOUNTER
Refill passed per [facility] protocol.     Hypothyroid Medications  Protocol Criteria:  Appointment scheduled in the past 12 months or the next 3 months  TSH resulted in the past 12 months that is normal  Recent Outpatient Visits            3 days ago

## 2019-01-17 NOTE — TELEPHONE ENCOUNTER
[facility] in [facility] requesting refill for Levothyroxine. States pt is currently at their location and unable to locate Rx we sent 11/1/18 to [facility].

## 2019-02-04 ENCOUNTER — OFFICE VISIT (OUTPATIENT)
Dept: FAMILY MEDICINE CLINIC | Facility: CLINIC | Age: 79
End: 2019-02-04
Payer: MEDICARE

## 2019-02-04 ENCOUNTER — TELEPHONE (OUTPATIENT)
Dept: FAMILY MEDICINE CLINIC | Facility: CLINIC | Age: 79
End: 2019-02-04

## 2019-02-04 VITALS
HEART RATE: 85 BPM | SYSTOLIC BLOOD PRESSURE: 114 MMHG | BODY MASS INDEX: 29 KG/M2 | DIASTOLIC BLOOD PRESSURE: 73 MMHG | WEIGHT: 173 LBS

## 2019-02-04 DIAGNOSIS — E11.9 TYPE 2 DIABETES MELLITUS WITHOUT COMPLICATION, WITHOUT LONG-TERM CURRENT USE OF INSULIN (HCC): ICD-10-CM

## 2019-02-04 DIAGNOSIS — I10 ESSENTIAL HYPERTENSION: Primary | ICD-10-CM

## 2019-02-04 PROCEDURE — G0463 HOSPITAL OUTPT CLINIC VISIT: HCPCS | Performed by: FAMILY MEDICINE

## 2019-02-04 PROCEDURE — 99214 OFFICE O/P EST MOD 30 MIN: CPT | Performed by: FAMILY MEDICINE

## 2019-02-04 NOTE — TELEPHONE ENCOUNTER
Pt called stating she would like to speak with Rn about rx   Current Outpatient Medications:  Quinapril HCl 10 MG Oral Tab Take 1 tablet (10 mg total) by mouth once daily.  Disp: 30 tablet Rfl: 11

## 2019-02-04 NOTE — TELEPHONE ENCOUNTER
Pt states the quanapril 10 mg that was dispensed to her looks different than her usual tablets. Informed pt to contact her pharmacy to see if they changed manufacturers and pt states she already did and was told the  was changed.  Pt states the

## 2019-02-04 NOTE — TELEPHONE ENCOUNTER
Patient called inquiring about medication and that she believes she was given the wrong medication from her pharmacy. Confirmed with patient that the medication she is inquiring about is Quinapril HCL 10 mg.  Patient states that the medication she picked up

## 2019-02-04 NOTE — PROGRESS NOTES
HPI:    Patient ID: Inocencio Vo is a 70year old female. HPI  Patient presents with:  Medication Problem: pt has questing on Quinipril   Hypertension: f/u medication    Review of Systems   Constitutional: Negative. Respiratory: Negative.     Car her own for three days now. May continue off of the med and see back in two weeks to recheck. Labs ordered to get prior to that visit.    Orders Placed This Encounter      Hemoglobin A1C [E]      Lipid Panel      Comp Metabolic Panel (14)      CBC With Dif

## 2019-02-28 ENCOUNTER — LAB ENCOUNTER (OUTPATIENT)
Dept: LAB | Age: 79
End: 2019-02-28
Attending: FAMILY MEDICINE
Payer: MEDICARE

## 2019-02-28 ENCOUNTER — OFFICE VISIT (OUTPATIENT)
Dept: FAMILY MEDICINE CLINIC | Facility: CLINIC | Age: 79
End: 2019-02-28
Payer: MEDICARE

## 2019-02-28 VITALS
BODY MASS INDEX: 33.09 KG/M2 | WEIGHT: 173 LBS | SYSTOLIC BLOOD PRESSURE: 116 MMHG | HEART RATE: 92 BPM | TEMPERATURE: 98 F | HEIGHT: 60.5 IN | DIASTOLIC BLOOD PRESSURE: 72 MMHG

## 2019-02-28 DIAGNOSIS — I10 BENIGN ESSENTIAL HTN: ICD-10-CM

## 2019-02-28 DIAGNOSIS — E03.9 ACQUIRED HYPOTHYROIDISM: ICD-10-CM

## 2019-02-28 DIAGNOSIS — R41.3 MEMORY IMPAIRMENT: ICD-10-CM

## 2019-02-28 DIAGNOSIS — M43.16 SPONDYLOLISTHESIS OF LUMBAR REGION: ICD-10-CM

## 2019-02-28 DIAGNOSIS — N39.46 MIXED STRESS AND URGE URINARY INCONTINENCE: ICD-10-CM

## 2019-02-28 DIAGNOSIS — M48.061 SPINAL STENOSIS OF LUMBAR REGION WITHOUT NEUROGENIC CLAUDICATION: ICD-10-CM

## 2019-02-28 DIAGNOSIS — Z00.00 ENCOUNTER FOR ANNUAL HEALTH EXAMINATION: Primary | ICD-10-CM

## 2019-02-28 DIAGNOSIS — M15.9 PRIMARY OSTEOARTHRITIS INVOLVING MULTIPLE JOINTS: ICD-10-CM

## 2019-02-28 DIAGNOSIS — F41.9 ANXIETY: ICD-10-CM

## 2019-02-28 DIAGNOSIS — E11.9 TYPE 2 DIABETES MELLITUS WITHOUT COMPLICATION, WITHOUT LONG-TERM CURRENT USE OF INSULIN (HCC): ICD-10-CM

## 2019-02-28 DIAGNOSIS — R26.81 UNSTEADY GAIT: ICD-10-CM

## 2019-02-28 PROBLEM — E86.0 DEHYDRATION: Status: RESOLVED | Noted: 2018-03-03 | Resolved: 2019-02-28

## 2019-02-28 LAB
ALBUMIN SERPL-MCNC: 4.2 G/DL (ref 3.4–5)
ALBUMIN/GLOB SERPL: 1 {RATIO} (ref 1–2)
ALP LIVER SERPL-CCNC: 112 U/L (ref 55–142)
ALT SERPL-CCNC: 27 U/L (ref 13–56)
ANION GAP SERPL CALC-SCNC: 8 MMOL/L (ref 0–18)
AST SERPL-CCNC: 25 U/L (ref 15–37)
BASOPHILS # BLD AUTO: 0.09 X10(3) UL (ref 0–0.2)
BASOPHILS NFR BLD AUTO: 1 %
BILIRUB SERPL-MCNC: 0.7 MG/DL (ref 0.1–2)
BUN BLD-MCNC: 17 MG/DL (ref 7–18)
BUN/CREAT SERPL: 18.9 (ref 10–20)
CALCIUM BLD-MCNC: 9.9 MG/DL (ref 8.5–10.1)
CHLORIDE SERPL-SCNC: 101 MMOL/L (ref 98–107)
CHOLEST SMN-MCNC: 215 MG/DL (ref ?–200)
CO2 SERPL-SCNC: 31 MMOL/L (ref 21–32)
CREAT BLD-MCNC: 0.9 MG/DL (ref 0.55–1.02)
DEPRECATED RDW RBC AUTO: 45.3 FL (ref 35.1–46.3)
EOSINOPHIL # BLD AUTO: 0.16 X10(3) UL (ref 0–0.7)
EOSINOPHIL NFR BLD AUTO: 1.8 %
ERYTHROCYTE [DISTWIDTH] IN BLOOD BY AUTOMATED COUNT: 13.2 % (ref 11–15)
EST. AVERAGE GLUCOSE BLD GHB EST-MCNC: 160 MG/DL (ref 68–126)
GLOBULIN PLAS-MCNC: 4.3 G/DL (ref 2.8–4.4)
GLUCOSE BLD-MCNC: 162 MG/DL (ref 70–99)
HBA1C MFR BLD HPLC: 7.2 % (ref ?–5.7)
HCT VFR BLD AUTO: 48.8 % (ref 35–48)
HDLC SERPL-MCNC: 54 MG/DL (ref 40–59)
HGB BLD-MCNC: 15.9 G/DL (ref 12–16)
IMM GRANULOCYTES # BLD AUTO: 0.02 X10(3) UL (ref 0–1)
IMM GRANULOCYTES NFR BLD: 0.2 %
LDLC SERPL CALC-MCNC: 117 MG/DL (ref ?–100)
LYMPHOCYTES # BLD AUTO: 2.1 X10(3) UL (ref 1–4)
LYMPHOCYTES NFR BLD AUTO: 23.5 %
M PROTEIN MFR SERPL ELPH: 8.5 G/DL (ref 6.4–8.2)
MCH RBC QN AUTO: 30.4 PG (ref 26–34)
MCHC RBC AUTO-ENTMCNC: 32.6 G/DL (ref 31–37)
MCV RBC AUTO: 93.3 FL (ref 80–100)
MONOCYTES # BLD AUTO: 0.71 X10(3) UL (ref 0.1–1)
MONOCYTES NFR BLD AUTO: 7.9 %
NEUTROPHILS # BLD AUTO: 5.87 X10 (3) UL (ref 1.5–7.7)
NEUTROPHILS # BLD AUTO: 5.87 X10(3) UL (ref 1.5–7.7)
NEUTROPHILS NFR BLD AUTO: 65.6 %
NONHDLC SERPL-MCNC: 161 MG/DL (ref ?–130)
OSMOLALITY SERPL CALC.SUM OF ELEC: 295 MOSM/KG (ref 275–295)
PLATELET # BLD AUTO: 247 10(3)UL (ref 150–450)
POTASSIUM SERPL-SCNC: 4.5 MMOL/L (ref 3.5–5.1)
RBC # BLD AUTO: 5.23 X10(6)UL (ref 3.8–5.3)
SODIUM SERPL-SCNC: 140 MMOL/L (ref 136–145)
TRIGL SERPL-MCNC: 220 MG/DL (ref 30–149)
TSI SER-ACNC: 0.83 MIU/ML (ref 0.36–3.74)
VLDLC SERPL CALC-MCNC: 44 MG/DL (ref 0–30)
WBC # BLD AUTO: 9 X10(3) UL (ref 4–11)

## 2019-02-28 PROCEDURE — 83036 HEMOGLOBIN GLYCOSYLATED A1C: CPT

## 2019-02-28 PROCEDURE — G0439 PPPS, SUBSEQ VISIT: HCPCS | Performed by: FAMILY MEDICINE

## 2019-02-28 PROCEDURE — 80061 LIPID PANEL: CPT

## 2019-02-28 PROCEDURE — 85025 COMPLETE CBC W/AUTO DIFF WBC: CPT

## 2019-02-28 PROCEDURE — 84443 ASSAY THYROID STIM HORMONE: CPT

## 2019-02-28 PROCEDURE — 80053 COMPREHEN METABOLIC PANEL: CPT

## 2019-02-28 PROCEDURE — 36415 COLL VENOUS BLD VENIPUNCTURE: CPT

## 2019-02-28 RX ORDER — OXYBUTYNIN CHLORIDE 10 MG/1
10 TABLET, EXTENDED RELEASE ORAL DAILY
Qty: 30 TABLET | Refills: 11 | Status: SHIPPED | OUTPATIENT
Start: 2019-02-28 | End: 2019-04-18

## 2019-02-28 RX ORDER — QUINAPRIL 10 MG/1
10 TABLET ORAL
Qty: 30 TABLET | Refills: 11 | Status: SHIPPED | OUTPATIENT
Start: 2019-02-28 | End: 2020-07-10

## 2019-02-28 NOTE — PATIENT INSTRUCTIONS
Deon Libman Gasperini's SCREENING SCHEDULE   Tests on this list are recommended by your physician but may not be covered, or covered at this frequency, by your insurer. Please check with your insurance carrier before scheduling to verify coverage.    [employer] family history    Colorectal Cancer Screening  Covered up to Age 76     Colonoscopy Screen   Covered every 10 years- more often if abnormal There are no preventive care reminders to display for this patient.  [facility] if applicable    Flex Pneumococcal 13 (Prevnar)  Covered Once after 65 No orders found for this or any previous visit.  Please get once after your 65th birthday    Pneumococcal 23 (Pneumovax)  Covered Once after 65 Orders placed or performed in visit on 02/26/18   • PNEUMOCOCCAL

## 2019-02-28 NOTE — PROGRESS NOTES
HPI:   Doug Sierra is a 70year old female who presents for a Medicare Subsequent Annual Wellness visit (Pt already had Initial Annual Wellness).       Her last annual assessment has been over 1 year: Annual Physical due on 02/26/2019         Fall/R She does NOT have a Living Will on file in [address] [place] [address] Rd.    Advance care planning including the explanation and discussion of advance directives standard forms performed Face to Face with patient and Family/surrogate (if present), and forms available to patient i (Office Visit) with Zee Parker DO:  Levothyroxine Sodium (SYNTHROID) 100 MCG Oral Tab Take 1 tablet (100 mcg total) by mouth once daily.    Glucose Blood (ONETOUCH VERIO) In Vitro Strip Test blood glucose twice daily at varying times   [facility] discharge or itching, no complaint of urinary incontinence   MUSCULOSKELETAL: denies back pain  NEURO: denies headaches  PSYCHE: denies depression or anxiety  HEMATOLOGIC: denies hx of anemia  ENDOCRINE: denies thyroid history  ALL/ASTHMA: denies hx of all they think I may have hearing loss:   No               Visual Acuity  Right Eye Visual Acuity: Corrected Right Eye Chart Acuity: 20/20   Left Eye Visual Acuity: Corrected Left Eye Chart Acuity: 20/20   Both Eyes Visual Acuity: Corrected Both Eyes Chart Acui Stable. (R26.81) Unsteady gait  Plan: VITAMIN D, 25-HYDROXY, VITAMIN B12        See neuro. Appears she may be in mild cognitive impairment.    (M43.16) Spondylolisthesis of lumbar region  Plan: stable. Feels well.   History of surgery    (M15.0) Primary Fasting Blood Sugar (FSB)Annually Glucose (mg/dL)   Date Value   02/28/2019 162 (H)          Cardiovascular Disease Screening     LDL Annually LDL Cholesterol (mg/dL)   Date Value   02/28/2019 117 (H)        EKG - w/ Initial Preventative Physical Exam only Factor VIII or IX concentrates   Clients of institutions for the mentally retarded   Persons who live in the same house as a HepB virus carrier   Homosexual men   Illicit injectable drug abusers     Tetanus Toxoid  Only covered with a cut with metal- TD an

## 2019-03-01 ENCOUNTER — TELEPHONE (OUTPATIENT)
Dept: FAMILY MEDICINE CLINIC | Facility: CLINIC | Age: 79
End: 2019-03-01

## 2019-03-01 NOTE — TELEPHONE ENCOUNTER
Pt called in said she wants to speak with PCP. She said PCP give her info and she is not willing to do this.     Pt did not go into much details    Contact # [phone] - home # can call any time

## 2019-03-29 ENCOUNTER — APPOINTMENT (OUTPATIENT)
Dept: ENDOCRINOLOGY | Facility: HOSPITAL | Age: 79
End: 2019-03-29
Attending: FAMILY MEDICINE
Payer: COMMERCIAL

## 2019-04-01 RX ORDER — LEVOTHYROXINE SODIUM 0.1 MG/1
100 TABLET ORAL
Qty: 90 TABLET | Refills: 0 | Status: SHIPPED | OUTPATIENT
Start: 2019-04-01 | End: 2019-07-12

## 2019-04-01 NOTE — TELEPHONE ENCOUNTER
Current Outpatient Medications:               Levothyroxine Sodium (SYNTHROID) 100 MCG Oral Tab Take 1 tablet (100 mcg total) by mouth once daily.  Disp: 90 tablet Rfl: 0

## 2019-04-01 NOTE — TELEPHONE ENCOUNTER
Refill passed per [address] protocol.   Hypothyroid Medications  Protocol Criteria:  Appointment scheduled in the past 12 months or the next 3 months  TSH resulted in the past 12 months that is normal  Recent Outpatient Visits            1 month ago Encounter for annual health examination    [address], [facility],     Office Visit    1 month ago Essential hypertension    [facility], [address] [place], [place]    Office Visit    2 months ago Spondylolisthesis of lumbar region    [facility],     Office Visit    6 months ago Injury of head, subsequent encounter    [facility] [place]    Office Visit    10 months ago Constipation, unspecified constipation type    [address]    Office Visit        Future Appointments       Provider Department Appt Notes    [facility] Rf'd Joan Rees, Medicare/[employer]        Lab Results   Component Value Date    TSH 0.831 02/28/2019

## 2019-04-15 ENCOUNTER — APPOINTMENT (OUTPATIENT)
Dept: CT IMAGING | Facility: HOSPITAL | Age: 79
End: 2019-04-15
Attending: EMERGENCY MEDICINE
Payer: MEDICARE

## 2019-04-15 PROCEDURE — 70450 CT HEAD/BRAIN W/O DYE: CPT | Performed by: EMERGENCY MEDICINE

## 2019-04-15 NOTE — ED NOTES
Patient was ambulatory to the bathroom gait is steady I placed a hat in the toilet she missed the hat.

## 2019-04-15 NOTE — ED PROVIDER NOTES
Patient Seen in: [facility] Emergency Department    History   Patient presents with:  Altered Mental Status (neurologic)  Anxiety/Panic attack (neurologic)    Stated Complaint: ams/anxiety    HPI    Patient is a 16-year-old female brought by paramed Smokeless tobacco: Never Used    Alcohol use: No    Drug use: No      Review of Systems    Positive for stated complaint: ams/anxiety  Other systems are as noted in HPI. Constitutional and vital signs reviewed.       All other systems reviewed and negative Urine Moderate (*)     Bacteria Urine Rare (*)     Squamous Epi.  Cells Moderate (*)     Mucous Urine 1+ (*)     All other components within normal limits   POCT GLUCOSE - Abnormal; Notable for the following components:    POC Glucose 147 (*)     All other taking her home at this time and I do not think she needs to evaluated by Jose Garcia. She very well may have some dementia and definitely needs to follow-up to have that further evaluated as an outpatient.   1 of her daughters can come pick her up after w

## 2019-04-15 NOTE — ED INITIAL ASSESSMENT (HPI)
Patient was brought in today by medics, patient is living with her oldest daughter. She is away on vacation    Per medics, patient is all over the place with her story of concerns today. She states she needs help with meds.  She went to bank to get  Money

## 2019-04-15 NOTE — ED NOTES
Report received from [employer] [facility] at this time. Informed that patient's daughter will be arriving at 65 pm today to take patient home. Patient sitting up in bed, calm at this time. Food tray at bedside. No distress observed.

## 2019-04-15 NOTE — ED NOTES
Patient is confused she did supply me with 4 family's home numbers to contact.  I forwarded them to

## 2019-04-15 NOTE — CM/SW NOTE
Spoke with both daughters Gary Strickland and Timanita Waltonmarcus today, regarding their mother and her ability to manage her independent living. The patient has lived with Gary Strickland for several months, on and off.  Both daughters believe that their mother is getting close to [facility]

## 2019-04-15 NOTE — ED NOTES
Patient's daughter here to take patient home. Provided with discharge paperwork. All questions answered.

## 2019-04-15 NOTE — ED NOTES
Patient assisted to bathroom at this time. Also assisted with getting dressed. Remains updated with plan of care. Waiting for daughter to arrive to ED to take her home. Ambulatory with steady gait. No distress observed.

## 2019-04-18 ENCOUNTER — APPOINTMENT (OUTPATIENT)
Dept: LAB | Age: 79
End: 2019-04-18
Attending: FAMILY MEDICINE
Payer: MEDICARE

## 2019-04-18 DIAGNOSIS — M15.9 PRIMARY OSTEOARTHRITIS INVOLVING MULTIPLE JOINTS: ICD-10-CM

## 2019-04-18 DIAGNOSIS — R26.81 UNSTEADY GAIT: ICD-10-CM

## 2019-04-18 PROCEDURE — 82306 VITAMIN D 25 HYDROXY: CPT

## 2019-04-18 PROCEDURE — 36415 COLL VENOUS BLD VENIPUNCTURE: CPT

## 2019-04-18 PROCEDURE — 82607 VITAMIN B-12: CPT

## 2019-04-18 NOTE — PROGRESS NOTES
HPI:    Patient ID: Lynsey Whitley is a 70year old female. HPI  Patient presents with:  Hospital F/U: follow up from ER at [facility] on 4/15/19 for anxiety     Review of Systems   Constitutional: Negative. Respiratory: Negative.     Cardiovascular: Neg Imaging & Referrals:  None       HI#[id number]

## 2019-04-29 ENCOUNTER — TELEPHONE (OUTPATIENT)
Dept: FAMILY MEDICINE CLINIC | Facility: CLINIC | Age: 79
End: 2019-04-29

## 2019-04-29 NOTE — TELEPHONE ENCOUNTER
Sade Rodriguez (friend-not ANURADHA) called and requesting recommendation from PCP as patient cancelled her neuro appointment and she is in state of panic attack now and teary, advised that since she is not listed as ANURADHA,advised to tell patient to call us back to get

## 2019-04-29 NOTE — TELEPHONE ENCOUNTER
TRENT stated Pt called back and will now speak to nurse- Pt asking if she should see Neuro because she canceled the Appt     Advised OV notes -Pt stated she will try to rescheduled                              ASSESSMENT/PLAN:   Anxiety  (primary encounter

## 2019-04-29 NOTE — TELEPHONE ENCOUNTER
Patient requesting a call regarding why needed to see Neurologist. Patient was very nervious on the phone and confused and was having a hard time remembering birthday.  Declined to speak with triage nurse only wanted to speak with Dr. Christian Rosales and advised un
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
## 2019-05-08 ENCOUNTER — TELEPHONE (OUTPATIENT)
Dept: NEUROLOGY | Facility: CLINIC | Age: 79
End: 2019-05-08

## 2019-05-08 ENCOUNTER — TELEPHONE (OUTPATIENT)
Dept: FAMILY MEDICINE CLINIC | Facility: CLINIC | Age: 79
End: 2019-05-08

## 2019-05-08 NOTE — TELEPHONE ENCOUNTER
Driving evaluation consists of 2 parts. 1st part is covered under Medicare Part B. 2nd part is Pt's responsibility. Called Pt. Mailbox is full, cannot l/m. Await return call to inform Pt. of above.

## 2019-05-08 NOTE — PROGRESS NOTES
Ms Inge Zamudio was in the office with her friend Helder Montgomery. 3-4-year history of short-term memory difficulty, word finding difficulty. No known family history of Alzheimer disease. She lives in the [facility], trying to sell her condo.   She apparently is is going to help her except for her friend. I did relate a friend may be her financial, medical power of  which is needed. I discussed all this at length, repetitively. Of asked her to think about this return to the office in 1 to 2 months.   Ashley Moffett

## 2019-05-08 NOTE — TELEPHONE ENCOUNTER
Patient came to clinic would like to speak to provider in regards to her appointment she had today with the neurologist.

## 2019-05-08 NOTE — TELEPHONE ENCOUNTER
Pts daughter requesting the medication that was recommended at the visit today.  They discussed it and would like to proceed with trying the medication, please advise

## 2019-05-08 NOTE — TELEPHONE ENCOUNTER
Tried calling patient. No answer and mailbox is full. No authorization to speak to daughter. Will need to confirm with patient if she would like to start medication.

## 2019-05-09 ENCOUNTER — TELEPHONE (OUTPATIENT)
Dept: NEUROLOGY | Facility: CLINIC | Age: 79
End: 2019-05-09

## 2019-05-09 RX ORDER — DONEPEZIL HYDROCHLORIDE 5 MG/1
TABLET, FILM COATED ORAL
Qty: 120 TABLET | Refills: 3 | Status: SHIPPED | OUTPATIENT
Start: 2019-05-09 | End: 2019-09-30 | Stop reason: ALTCHOICE

## 2019-05-09 NOTE — TELEPHONE ENCOUNTER
Spoke with friend Viktoria Salcido. States she talked the patient into taking medication that  suggested at [address] [place] [address]. Informed Arvind Streeter that she is not authorized to have discussions with [facility] per hipaa. Arvind Streeter requested to speak to Marisa BURGOS.

## 2019-05-09 NOTE — TELEPHONE ENCOUNTER
Please call the patient tell her I prescribed the medication. Please have her return to the office in 3 months for reexamination.

## 2019-05-09 NOTE — TELEPHONE ENCOUNTER
Spoke to friend Viktoria Salcido. States patient has agreed to medication and would like to have Dr Toni William prescribe medication discussed at [address] [place] [address] 5/8/19. Also advised would like pharmacy changed to [facility] location.  Is listed in c

## 2019-05-13 NOTE — TELEPHONE ENCOUNTER
Spoke to patient and notified her of below. She was understanding and states that she picked up the medication and started it already. She has been taking it for 3 days without side effects.

## 2019-05-23 ENCOUNTER — TELEPHONE (OUTPATIENT)
Dept: FAMILY MEDICINE CLINIC | Facility: CLINIC | Age: 79
End: 2019-05-23

## 2019-05-23 DIAGNOSIS — H53.9 VISION CHANGES: Primary | ICD-10-CM

## 2019-05-23 NOTE — TELEPHONE ENCOUNTER
Patient requesting referral for Opthalmologist due to needs eye exam.     Please call patient once completed.

## 2019-05-24 NOTE — TELEPHONE ENCOUNTER
Patient following up for referral, requesting referral for eye check up, reports has been awhile since she's had follow up, also feels as if vision is harder to focus, would like to follow up, please advise on referral.

## 2019-05-28 ENCOUNTER — TELEPHONE (OUTPATIENT)
Dept: NEUROLOGY | Facility: CLINIC | Age: 79
End: 2019-05-28

## 2019-05-28 NOTE — TELEPHONE ENCOUNTER
Pts friend calling but she is not HIPPA verified, please call pt as friend is indicating that pt was not treated well at her last visit and her diagnosis was not explained to her at the last visit and is needing a more thorough explanation.  Friend of pt, LEN

## 2019-05-29 NOTE — TELEPHONE ENCOUNTER
Spoke to patient and advised her friend Rosio Higuera called to report patient not treated well at [address] [place] [address] with Dr Lalitha Mishra. Patient states she has no issues and can not talk any further at this time. Adeola Justin is with her now.  Asked patient to call offic

## 2019-05-31 NOTE — TELEPHONE ENCOUNTER
Spoke to Thad Cuello, I  did not give any medical information to her. Chon Bashir is not listed on consent to release medical information to. Chon Bashir states Dr Yung Heredia did not spend enough time with patient or go over diagnosis with patient at [address] [place] [address].  B

## 2019-05-31 NOTE — TELEPHONE ENCOUNTER
Discussed with Ani-would recommend patient return with family member or one who has medical POA. The statements by friend are incorrect and do not reflect what occurred during office visit.

## 2019-06-03 NOTE — TELEPHONE ENCOUNTER
Spoke to patient and she requests no medical information be shared with Chantal Serna. States she tried donepezil for 2 weeks and stopped due to GI symptoms. Advised patient to make NOV with Dr Snow Francisco and bring family or friend with her.  States she alden

## 2019-07-13 RX ORDER — LEVOTHYROXINE SODIUM 100 MCG
TABLET ORAL
Qty: 90 TABLET | Refills: 1 | Status: SHIPPED | OUTPATIENT
Start: 2019-07-13 | End: 2020-07-10

## 2019-07-13 NOTE — TELEPHONE ENCOUNTER
Refill passed per [facility] protocol.   Hypothyroid Medications  Protocol Criteria:  Appointment scheduled in the past 12 months or the next 3 months  TSH resulted in the past 12 months that is normal  Recent Outpatient Visits            2 months ago

## 2019-09-30 ENCOUNTER — OFFICE VISIT (OUTPATIENT)
Dept: FAMILY MEDICINE CLINIC | Facility: CLINIC | Age: 79
End: 2019-09-30
Payer: MEDICARE

## 2019-09-30 VITALS
WEIGHT: 163 LBS | SYSTOLIC BLOOD PRESSURE: 132 MMHG | BODY MASS INDEX: 31.18 KG/M2 | HEART RATE: 75 BPM | DIASTOLIC BLOOD PRESSURE: 85 MMHG | RESPIRATION RATE: 20 BRPM | TEMPERATURE: 98 F | HEIGHT: 60.5 IN

## 2019-09-30 DIAGNOSIS — R41.3 MEMORY IMPAIRMENT: Primary | ICD-10-CM

## 2019-09-30 DIAGNOSIS — F41.9 ANXIETY: ICD-10-CM

## 2019-09-30 DIAGNOSIS — I10 BENIGN ESSENTIAL HTN: ICD-10-CM

## 2019-09-30 PROCEDURE — G0463 HOSPITAL OUTPT CLINIC VISIT: HCPCS | Performed by: FAMILY MEDICINE

## 2019-09-30 PROCEDURE — 99214 OFFICE O/P EST MOD 30 MIN: CPT | Performed by: FAMILY MEDICINE

## 2019-09-30 RX ORDER — CITALOPRAM 10 MG/1
10 TABLET ORAL DAILY
Qty: 30 TABLET | Refills: 1 | Status: SHIPPED | OUTPATIENT
Start: 2019-09-30 | End: 2019-10-22

## 2019-09-30 NOTE — PROGRESS NOTES
HPI:    Patient ID: Siena Patiño is a 70year old female. HPI  Patient presents with: Follow - Up: questions  She describes an episode of confusion. She requests appointment with a neurologists. .    Review of Systems   Constitutional: Negative. definitely has an aspect of dementia that is untreated at this time recommend once again a follow-up with neurology I will also start her an antidepressant now and like to follow-up in a month her daughter was present during exam      No orders of the [facility]

## 2019-10-22 ENCOUNTER — OFFICE VISIT (OUTPATIENT)
Dept: FAMILY MEDICINE CLINIC | Facility: CLINIC | Age: 79
End: 2019-10-22
Payer: MEDICARE

## 2019-10-22 VITALS
DIASTOLIC BLOOD PRESSURE: 77 MMHG | SYSTOLIC BLOOD PRESSURE: 118 MMHG | HEART RATE: 93 BPM | BODY MASS INDEX: 31.18 KG/M2 | HEIGHT: 60.5 IN | WEIGHT: 163 LBS | TEMPERATURE: 98 F | OXYGEN SATURATION: 98 %

## 2019-10-22 DIAGNOSIS — J06.9 UPPER RESPIRATORY TRACT INFECTION, UNSPECIFIED TYPE: Primary | ICD-10-CM

## 2019-10-22 PROCEDURE — G0463 HOSPITAL OUTPT CLINIC VISIT: HCPCS | Performed by: PHYSICIAN ASSISTANT

## 2019-10-22 PROCEDURE — 99213 OFFICE O/P EST LOW 20 MIN: CPT | Performed by: PHYSICIAN ASSISTANT

## 2019-10-22 RX ORDER — PREDNISONE 20 MG/1
TABLET ORAL
Qty: 12 TABLET | Refills: 0 | Status: ON HOLD | OUTPATIENT
Start: 2019-10-22 | End: 2019-11-03 | Stop reason: ALTCHOICE

## 2019-10-22 NOTE — PROGRESS NOTES
HPI:    Patient ID: Emelia Jeff is a 70year old female. Pt presents with cold symptoms for the past 3 days. Pt has had cough (dry cough), sore throat. Has fevers. Pt has tried otc remedies without relief. Pt states no sick contacts.  Hx of allergi and time. She appears well-developed and well-nourished. HENT:   Right Ear: Tympanic membrane, external ear and ear canal normal. Tympanic membrane is not erythematous.  No cerumen present  Left Ear: External ear and ear canal normal. Tympanic membrane is

## 2019-10-22 NOTE — PATIENT INSTRUCTIONS
Prednisone  Take 2 pill for 3 days then 1 pill for 3 days then 1/2 a pill for 3 days     Take Advil and Tylenol for pain and fever     Hydrate  Steam form the shower

## 2019-10-28 ENCOUNTER — TELEPHONE (OUTPATIENT)
Dept: FAMILY MEDICINE CLINIC | Facility: CLINIC | Age: 79
End: 2019-10-28

## 2019-10-28 DIAGNOSIS — R05.9 COUGH: Primary | ICD-10-CM

## 2019-10-28 NOTE — TELEPHONE ENCOUNTER
Spoke with patient and daughter--patient completed prednisone taper--no fever, but \"coughing up a lot of white phlegm. People keep telling me I sound winded, like I can't catch my breath. I just feel congested--I don't feel good. \"    Patient denies chest

## 2019-10-28 NOTE — TELEPHONE ENCOUNTER
Spoke with patient and dtr Ti Mccormick and relayed Dr. Luis Buckley message below--they verbalize understanding and agreement. Daughter Ti Mccormick is with patient and will take her to ER if symptoms worsen prior to appt tomorrow.  No further questions/concerns at this ti

## 2019-10-28 NOTE — TELEPHONE ENCOUNTER
Agree to wait until tomorrow unless short of breath. Recommend CXR if she can have done before seeing me. Otherwise she can have performed after the visit.

## 2019-10-29 ENCOUNTER — OFFICE VISIT (OUTPATIENT)
Dept: FAMILY MEDICINE CLINIC | Facility: CLINIC | Age: 79
End: 2019-10-29
Payer: MEDICARE

## 2019-10-29 ENCOUNTER — HOSPITAL ENCOUNTER (OUTPATIENT)
Dept: GENERAL RADIOLOGY | Facility: HOSPITAL | Age: 79
Discharge: HOME | End: 2019-10-29
Attending: FAMILY MEDICINE | Admitting: FAMILY MEDICINE
Payer: MEDICARE

## 2019-10-29 VITALS
OXYGEN SATURATION: 98 % | DIASTOLIC BLOOD PRESSURE: 82 MMHG | TEMPERATURE: 98 F | BODY MASS INDEX: 31.18 KG/M2 | WEIGHT: 163 LBS | HEIGHT: 60.5 IN | HEART RATE: 71 BPM | SYSTOLIC BLOOD PRESSURE: 145 MMHG

## 2019-10-29 DIAGNOSIS — J06.9 UPPER RESPIRATORY TRACT INFECTION, UNSPECIFIED TYPE: Primary | ICD-10-CM

## 2019-10-29 DIAGNOSIS — F02.81 ALZHEIMER'S DISEASE OF OTHER ONSET WITH BEHAVIORAL DISTURBANCE: ICD-10-CM

## 2019-10-29 DIAGNOSIS — R41.0 CONFUSION: ICD-10-CM

## 2019-10-29 DIAGNOSIS — R05.9 COUGH: ICD-10-CM

## 2019-10-29 DIAGNOSIS — G30.8 ALZHEIMER'S DISEASE OF OTHER ONSET WITH BEHAVIORAL DISTURBANCE: ICD-10-CM

## 2019-10-29 DIAGNOSIS — R41.3 MEMORY IMPAIRMENT: ICD-10-CM

## 2019-10-29 PROCEDURE — 71046 X-RAY EXAM CHEST 2 VIEWS: CPT | Performed by: FAMILY MEDICINE

## 2019-10-29 PROCEDURE — G0463 HOSPITAL OUTPT CLINIC VISIT: HCPCS | Performed by: FAMILY MEDICINE

## 2019-10-29 PROCEDURE — 99214 OFFICE O/P EST MOD 30 MIN: CPT | Performed by: FAMILY MEDICINE

## 2019-10-29 RX ORDER — DONEPEZIL HYDROCHLORIDE 5 MG/1
5 TABLET, FILM COATED ORAL NIGHTLY
Qty: 30 TABLET | Refills: 1 | Status: SHIPPED | OUTPATIENT
Start: 2019-10-29 | End: 2020-07-10

## 2019-10-29 NOTE — PROGRESS NOTES
HPI:    Patient ID: Ray Pyle is a 70year old female. HPI  Patient presents with daughter today with a complaint of difficulty finding a place to live.   She has been preparing her own condominium for sale and has been living with one daughter w not identify president's name.               ASSESSMENT/PLAN:   Upper respiratory tract infection, unspecified type  (primary encounter diagnosis)  Confusion    (G30.8,  F02.81) Alzheimer's disease of other onset with behavioral disturbance ([facility]  Plan: My

## 2019-11-03 ENCOUNTER — APPOINTMENT (OUTPATIENT)
Dept: GENERAL RADIOLOGY | Facility: HOSPITAL | Age: 79
End: 2019-11-03
Attending: EMERGENCY MEDICINE
Payer: MEDICARE

## 2019-11-03 PROBLEM — F03.90 MAJOR NEUROCOGNITIVE DISORDER (HCC): Status: ACTIVE | Noted: 2019-11-03

## 2019-11-03 PROCEDURE — 71045 X-RAY EXAM CHEST 1 VIEW: CPT | Performed by: EMERGENCY MEDICINE

## 2019-11-03 NOTE — BH LEVEL OF CARE ASSESSMENT
Level of Care Assessment Note    General Questions  Why are you here?: \"They're taking my money and I just want a friend\"  Precipitating Events: Pt A & O x 1. Pt unable to provide details for the assessment due to her clinical condition.   History of Pres police. Family's Biggest Areas of Concern: \"She needs 24 hour care\".      Referral Source  Referral Source: Legal  Legal: Police  Organization Name: [employer]  Person/Contact Name: N/A  Phone: N/A  Email: N/A    Suicide Risk  Source of information fo firearms/weapons  Do you have a firearm owner ID card?: No  Collateral for any access to means/firearms/weapons: Pt does not have any access to any firearms/weapons    Self Injury  History of Self Injurious Behaviors: (Unable to assess)  Present Self-Injur lately and agitated, but has no specific triggers. Potentially Dangerous Behaviors: Wandering;Agitation; Attempting to Leave;Combative with/refusing care  Noisy Vocalizations: Moaning;Yelling;Screaming;Crying  Frequent Distress Calls: No  Responsiveness t has diabetes)  Does the patient need a BiPAP or CPAP?: No  Intellectual disability reported? Intellectual Disability?: No  Reported Social/Emotional Functioning Level  Describe: Pt is social and reports she would like more friends.  Pt's emotional function something to you that makes you feel unsafe?: No  Have You Ever Been Harmed by a Partner/Caregiver?: No  Health Concerns r/t Abuse: No    General Appearance  Characteristics: Appropriate clothing(Hospital gown)  Eye Contact: Direct  Psychomotor Behavior  G old condo, which is inhabitable. Pt's daughter Freddie Maza left her home and has been staying with pt. She states pt has become more combative and easily agitated. Pt has no prior psych hx, diagnosis, or tx. Pt denies current SI.  However, pt's daughter report

## 2019-11-03 NOTE — ED NOTES
No change in patient assessment, pt has no complaints, dosing initially, given breakfast tray, cooperative and pleasant

## 2019-11-03 NOTE — ED NOTES
Patient was accepted to [facility]  Dr Ester Gamble is the accepting   Bed #680C  RN #[id number]

## 2019-11-03 NOTE — CM/SW NOTE
Spoke with patient's daughter Olga Quintero at [phone]. Patient wandering. Became agitated. States PCP believes patient needs inpatient psych.      Advised daughter the ED does not have control over that decision, but if she feels it is appropriate and wants h

## 2019-11-03 NOTE — CM/SW NOTE
After informing ED CM at 0900 that she would be here in 45 minutes to an hour, she has not yet arrived. TC to daughter Sharon Melton again. She now expects to be here by noon.  Informed her that [facility] would contact her by phone for info as her mom is medically re

## 2019-11-03 NOTE — ED NOTES
Pt appears worried, has no complaints, pt reassured that she is doing ok, given lunchbox, vss awaiting room assignment at dani

## 2019-11-03 NOTE — ED PROVIDER NOTES
Patient Seen in: [facility] Emergency Department      History   Patient presents with:  Altered Mental Status (neurologic)    Stated Complaint: dementia, wandering     HPI    Patient is a 61-year-old female with a history of dementia was brought by ED Triage Vitals [11/03/19 0746]   /77   Pulse 76   Resp 16   Temp 98.6 °F (37 °C)   Temp src Temporal   SpO2 95 %   O2 Device None (Room air)       Current:/76   Pulse 82   Temp 98.6 °F (37 °C) (Temporal)   Resp 16   Wt 73.9 kg   LMP 08/28 for these tests on the individual orders. RAINBOW DRAW BLUE   RAINBOW DRAW LAVENDER   RAINBOW DRAW LIGHT GREEN   RAINBOW DRAW GOLD   CBC W/ DIFFERENTIAL          Xr Chest Pa + Lat Chest (cpt=71046)    Result Date: 10/29/2019  CONCLUSION:  1.  Atherosclero unable to care for herself. After discussion with the psychiatrist patient will be admitted to [facility] for further psychiatric care.         Disposition and Plan     Clinical Impression:  Dementia without behavioral disturbance, unspecified dementia ty

## 2019-11-03 NOTE — ED NOTES
Pt tearful, worried that her children will take her money, awaiting  to assess pt, pt confused at times, seems to be searching for appropriate wording, answers question and follows direction appropriately, will continue to monitor, vss, pt has

## 2019-11-03 NOTE — ED INITIAL ASSESSMENT (HPI)
Pt presents via ems found wandering in her apt complex, daughter sending to er stating that she can no longer take care of her

## 2019-11-04 ENCOUNTER — TELEPHONE (OUTPATIENT)
Dept: OTHER | Age: 79
End: 2019-11-04

## 2019-11-04 PROBLEM — F06.30 MOOD DISORDER IN CONDITIONS CLASSIFIED ELSEWHERE: Status: ACTIVE | Noted: 2019-11-04

## 2019-11-04 NOTE — TELEPHONE ENCOUNTER
Daughter Carlos Ric is calling you with update that patient was admitted to [facility] for psychological evaluation. Patient has been wandering and daughter looking for nursing home where she will be safe. Wanted to keep you informed.

## 2019-12-03 ENCOUNTER — TELEPHONE (OUTPATIENT)
Dept: NEUROLOGY | Facility: CLINIC | Age: 79
End: 2019-12-03

## 2020-07-09 ENCOUNTER — HOSPITAL ENCOUNTER (EMERGENCY)
Facility: HOSPITAL | Age: 80
Discharge: HOME | End: 2020-07-09
Attending: EMERGENCY MEDICINE | Admitting: FAMILY MEDICINE
Payer: MEDICARE

## 2020-07-09 ENCOUNTER — TELEPHONE (OUTPATIENT)
Dept: FAMILY MEDICINE CLINIC | Facility: CLINIC | Age: 80
End: 2020-07-09

## 2020-07-09 ENCOUNTER — APPOINTMENT (OUTPATIENT)
Dept: GENERAL RADIOLOGY | Facility: HOSPITAL | Age: 80
End: 2020-07-09
Attending: EMERGENCY MEDICINE
Payer: MEDICARE

## 2020-07-09 ENCOUNTER — APPOINTMENT (OUTPATIENT)
Dept: CT IMAGING | Facility: HOSPITAL | Age: 80
End: 2020-07-09
Attending: EMERGENCY MEDICINE
Payer: MEDICARE

## 2020-07-09 ENCOUNTER — OFFICE VISIT (OUTPATIENT)
Dept: FAMILY MEDICINE CLINIC | Facility: CLINIC | Age: 80
End: 2020-07-09
Payer: MEDICARE

## 2020-07-09 VITALS
OXYGEN SATURATION: 99 % | WEIGHT: 160 LBS | SYSTOLIC BLOOD PRESSURE: 129 MMHG | HEART RATE: 73 BPM | HEIGHT: 64 IN | DIASTOLIC BLOOD PRESSURE: 109 MMHG | RESPIRATION RATE: 18 BRPM | BODY MASS INDEX: 27.31 KG/M2

## 2020-07-09 VITALS
WEIGHT: 160 LBS | BODY MASS INDEX: 27.31 KG/M2 | HEIGHT: 64 IN | DIASTOLIC BLOOD PRESSURE: 59 MMHG | SYSTOLIC BLOOD PRESSURE: 93 MMHG | HEART RATE: 81 BPM | TEMPERATURE: 99 F

## 2020-07-09 DIAGNOSIS — S39.012A LOW BACK STRAIN, INITIAL ENCOUNTER: ICD-10-CM

## 2020-07-09 DIAGNOSIS — S09.8XXA BLUNT HEAD INJURY, INITIAL ENCOUNTER: Primary | ICD-10-CM

## 2020-07-09 DIAGNOSIS — F41.9 ANXIETY: ICD-10-CM

## 2020-07-09 DIAGNOSIS — M43.16 SPONDYLOLISTHESIS OF LUMBAR REGION: ICD-10-CM

## 2020-07-09 DIAGNOSIS — S16.1XXA STRAIN OF NECK MUSCLE, INITIAL ENCOUNTER: ICD-10-CM

## 2020-07-09 DIAGNOSIS — F02.81 ALZHEIMER'S DISEASE OF OTHER ONSET WITH BEHAVIORAL DISTURBANCE: ICD-10-CM

## 2020-07-09 DIAGNOSIS — S40.012A CONTUSION OF LEFT SHOULDER, INITIAL ENCOUNTER: ICD-10-CM
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
## 2020-07-09 DIAGNOSIS — G30.8 ALZHEIMER'S DISEASE OF OTHER ONSET WITH BEHAVIORAL DISTURBANCE: ICD-10-CM

## 2020-07-09 LAB
ANION GAP SERPL CALC-SCNC: 7 MMOL/L (ref 0–18)
BASOPHILS # BLD AUTO: 0.09 X10(3) UL (ref 0–0.2)
BASOPHILS NFR BLD AUTO: 0.9 %
BILIRUB UR QL: NEGATIVE
BUN BLD-MCNC: 19 MG/DL (ref 7–18)
BUN/CREAT SERPL: 21.8 (ref 10–20)
CALCIUM BLD-MCNC: 9.9 MG/DL (ref 8.5–10.1)
CHLORIDE SERPL-SCNC: 108 MMOL/L (ref 98–112)
CLARITY UR: CLEAR
CO2 SERPL-SCNC: 26 MMOL/L (ref 21–32)
COLOR UR: YELLOW
CREAT BLD-MCNC: 0.87 MG/DL (ref 0.55–1.02)
DEPRECATED RDW RBC AUTO: 42.4 FL (ref 35.1–46.3)
EOSINOPHIL # BLD AUTO: 0.1 X10(3) UL (ref 0–0.7)
EOSINOPHIL NFR BLD AUTO: 1 %
ERYTHROCYTE [DISTWIDTH] IN BLOOD BY AUTOMATED COUNT: 12.9 % (ref 11–15)
GLUCOSE BLD-MCNC: 124 MG/DL (ref 70–99)
GLUCOSE UR-MCNC: NEGATIVE MG/DL
HCT VFR BLD AUTO: 44.6 % (ref 35–48)
HGB BLD-MCNC: 15.5 G/DL (ref 12–16)
HGB UR QL STRIP.AUTO: NEGATIVE
IMM GRANULOCYTES # BLD AUTO: 0.03 X10(3) UL (ref 0–1)
IMM GRANULOCYTES NFR BLD: 0.3 %
KETONES UR-MCNC: NEGATIVE MG/DL
LYMPHOCYTES # BLD AUTO: 1.76 X10(3) UL (ref 1–4)
LYMPHOCYTES NFR BLD AUTO: 18.2 %
MCH RBC QN AUTO: 31.3 PG (ref 26–34)
MCHC RBC AUTO-ENTMCNC: 34.8 G/DL (ref 31–37)
MCV RBC AUTO: 89.9 FL (ref 80–100)
MONOCYTES # BLD AUTO: 0.7 X10(3) UL (ref 0.1–1)
MONOCYTES NFR BLD AUTO: 7.2 %
NEUTROPHILS # BLD AUTO: 7 X10 (3) UL (ref 1.5–7.7)
NEUTROPHILS # BLD AUTO: 7 X10(3) UL (ref 1.5–7.7)
NEUTROPHILS NFR BLD AUTO: 72.4 %
NITRITE UR QL STRIP.AUTO: NEGATIVE
OSMOLALITY SERPL CALC.SUM OF ELEC: 296 MOSM/KG (ref 275–295)
PH UR: 8 [PH] (ref 5–8)
PLATELET # BLD AUTO: 196 10(3)UL (ref 150–450)
POTASSIUM SERPL-SCNC: 3.6 MMOL/L (ref 3.5–5.1)
PROT UR-MCNC: NEGATIVE MG/DL
RBC # BLD AUTO: 4.96 X10(6)UL (ref 3.8–5.3)
RBC #/AREA URNS AUTO: <1 /HPF
SODIUM SERPL-SCNC: 141 MMOL/L (ref 136–145)
SP GR UR STRIP: 1.01 (ref 1–1.03)
UROBILINOGEN UR STRIP-ACNC: <2
WBC # BLD AUTO: 9.7 X10(3) UL (ref 4–11)
WBC #/AREA URNS AUTO: 8 /HPF

## 2020-07-09 PROCEDURE — 70450 CT HEAD/BRAIN W/O DYE: CPT | Performed by: EMERGENCY MEDICINE

## 2020-07-09 PROCEDURE — 73030 X-RAY EXAM OF SHOULDER: CPT | Performed by: EMERGENCY MEDICINE

## 2020-07-09 PROCEDURE — 87186 SC STD MICRODIL/AGAR DIL: CPT | Performed by: EMERGENCY MEDICINE

## 2020-07-09 PROCEDURE — 81001 URINALYSIS AUTO W/SCOPE: CPT | Performed by: EMERGENCY MEDICINE

## 2020-07-09 PROCEDURE — G0463 HOSPITAL OUTPT CLINIC VISIT: HCPCS | Performed by: FAMILY MEDICINE

## 2020-07-09 PROCEDURE — 36415 COLL VENOUS BLD VENIPUNCTURE: CPT

## 2020-07-09 PROCEDURE — 72100 X-RAY EXAM L-S SPINE 2/3 VWS: CPT | Performed by: EMERGENCY MEDICINE

## 2020-07-09 PROCEDURE — 87086 URINE CULTURE/COLONY COUNT: CPT | Performed by: EMERGENCY MEDICINE

## 2020-07-09 PROCEDURE — 80048 BASIC METABOLIC PNL TOTAL CA: CPT | Performed by: EMERGENCY MEDICINE

## 2020-07-09 PROCEDURE — 93010 ELECTROCARDIOGRAM REPORT: CPT | Performed by: EMERGENCY MEDICINE

## 2020-07-09 PROCEDURE — 99284 EMERGENCY DEPT VISIT MOD MDM: CPT

## 2020-07-09 PROCEDURE — 1111F DSCHRG MED/CURRENT MED MERGE: CPT | Performed by: FAMILY MEDICINE

## 2020-07-09 PROCEDURE — 85025 COMPLETE CBC W/AUTO DIFF WBC: CPT | Performed by: EMERGENCY MEDICINE

## 2020-07-09 PROCEDURE — 87088 URINE BACTERIA CULTURE: CPT | Performed by: EMERGENCY MEDICINE

## 2020-07-09 PROCEDURE — 93005 ELECTROCARDIOGRAM TRACING: CPT

## 2020-07-09 PROCEDURE — 99214 OFFICE O/P EST MOD 30 MIN: CPT | Performed by: FAMILY MEDICINE

## 2020-07-09 PROCEDURE — 72125 CT NECK SPINE W/O DYE: CPT | Performed by: EMERGENCY MEDICINE

## 2020-07-09 RX ORDER — ACETAMINOPHEN 325 MG/1
650 TABLET ORAL ONCE
Status: COMPLETED | OUTPATIENT
Start: 2020-07-09 | End: 2020-07-09

## 2020-07-09 NOTE — CM/SW NOTE
I spoke to the pt and her daughter at the bedside. The pt came to [facility] from [facility]. The pt is her own POA. The pt does not want to go back to [place] and [place]  Her daughter stated s

## 2020-07-09 NOTE — CM/SW NOTE
The neuro psych eval results were faxed to Dr Hugh Thurston off at #[phone]. As soon as I get the med list faxed to me I will fax it to Dr Hugh Thurston office.     I updated Zoey, the pt's daughter to call Aundrea Meraz at [facility] to complete the necessary p

## 2020-07-09 NOTE — PROGRESS NOTES
HPI:    Patient ID: Shikha Oneill is a 70year old female. HPI  Patient presents with:  Hospital F/U: follow up from ER at [facility] on 7/9/2020 for falling off bed at night at nursing home   Xray clear of any fractures.  She is not going to return to the at times. Alzheimer's disease of other onset with behavioral disturbance (hcc)  No major change. insturcted daughter she will need 24/7 care or near that at her home. For safety. BP running low.   See back in on e month and rechecck and consider d/c l

## 2020-07-09 NOTE — TELEPHONE ENCOUNTER
FYI - Patient's daughter, Ruston calling to advise the patient has been taken to [address] this morning by ambulance.

## 2020-07-09 NOTE — ED INITIAL ASSESSMENT (HPI)
Patient arrived via [facility] ambulance from [facility] for a fall this morning at 0200. Per EMS, [place] staff found patient and assisted to dining room at 0800. Patient c/o neck pain. Hx dementia, A&Ox2-3- baseline.

## 2020-07-09 NOTE — TELEPHONE ENCOUNTER
Dwight Jessica also reports daughter wanting to set up appt with Dr. Penny Hsieh for diarrhea today after ER--relayed that patient needs to be evaluated in ER first--unknown as of yet what ER disposition will be. After ER evaluation, ER f/u appt can be scheduled.     Sent to Dr. Penny Hsieh as FYI    RN to check ER f/u 7/10/2020

## 2020-07-09 NOTE — ED PROVIDER NOTES
Patient Seen in: [facility] AND [facility] Emergency Department      History   Patient presents with:  Fall    Stated Complaint:     HPI    70year old female with multiple medical issues including dementia, diabetes, hypertension, osteoarthritis who is brought complaint:   Other systems are as noted in HPI. Constitutional and vital signs reviewed. All other systems reviewed and negative except as noted above.     Physical Exam     ED Triage Vitals [07/09/20 0910]   BP (!) 163/75   Pulse 63   Resp 22   Temp anxious. Behavior: Behavior normal. Behavior is cooperative.            ED Course     Labs Reviewed   URINALYSIS WITH CULTURE REFLEX - Abnormal; Notable for the following components:       Result Value    Leukocyte Esterase Urine Trace (*)     WBC U (66978)    Result Date: 7/9/2020  CONCLUSION:  Mild chronic microvascular ischemic disease without acute intracranial abnormality.   Left sphenoid sinusitis  Dictated by (CST): Adry Mary MD on 7/09/2020 at 10:00 AM     Finalized by (CST): Adry Mary MD med list from the facility.               Disposition and Plan     Clinical Impression:  Blunt head injury, initial encounter  (primary encounter diagnosis)  Strain of neck muscle, initial encounter  Contusion of left shoulder, initial encounter  Low back s

## 2020-07-10 ENCOUNTER — TELEPHONE (OUTPATIENT)
Dept: FAMILY MEDICINE CLINIC | Facility: CLINIC | Age: 80
End: 2020-07-10

## 2020-07-10 ENCOUNTER — HOSPITAL ENCOUNTER (EMERGENCY)
Facility: HOSPITAL | Age: 80
Discharge: HOME | End: 2020-07-10
Attending: EMERGENCY MEDICINE
Payer: MEDICARE

## 2020-07-10 ENCOUNTER — APPOINTMENT (OUTPATIENT)
Dept: GENERAL RADIOLOGY | Facility: HOSPITAL | Age: 80
End: 2020-07-10
Attending: EMERGENCY MEDICINE
Payer: MEDICARE

## 2020-07-10 ENCOUNTER — APPOINTMENT (OUTPATIENT)
Dept: CT IMAGING | Facility: HOSPITAL | Age: 80
End: 2020-07-10
Attending: EMERGENCY MEDICINE
Payer: MEDICARE

## 2020-07-10 VITALS
OXYGEN SATURATION: 97 % | BODY MASS INDEX: 25.71 KG/M2 | HEART RATE: 63 BPM | TEMPERATURE: 98 F | HEIGHT: 66 IN | DIASTOLIC BLOOD PRESSURE: 72 MMHG | RESPIRATION RATE: 18 BRPM | WEIGHT: 160 LBS | SYSTOLIC BLOOD PRESSURE: 147 MMHG

## 2020-07-10 DIAGNOSIS — S42.001A CLOSED NONDISPLACED FRACTURE OF RIGHT CLAVICLE, UNSPECIFIED PART OF CLAVICLE, INITIAL ENCOUNTER: Primary | ICD-10-CM

## 2020-07-10 DIAGNOSIS — N39.0 URINARY TRACT INFECTION WITHOUT HEMATURIA, SITE UNSPECIFIED: ICD-10-CM

## 2020-07-10 LAB
ALBUMIN SERPL-MCNC: 3.3 G/DL (ref 3.4–5)
ALBUMIN/GLOB SERPL: 0.9 {RATIO} (ref 1–2)
ALP LIVER SERPL-CCNC: 80 U/L (ref 55–142)
ALT SERPL-CCNC: 18 U/L (ref 13–56)
ANION GAP SERPL CALC-SCNC: 9 MMOL/L (ref 0–18)
AST SERPL-CCNC: 25 U/L (ref 15–37)
BASOPHILS # BLD AUTO: 0.07 X10(3) UL (ref 0–0.2)
BASOPHILS NFR BLD AUTO: 0.7 %
BILIRUB SERPL-MCNC: 0.8 MG/DL (ref 0.1–2)
BILIRUB UR QL STRIP.AUTO: NEGATIVE
BUN BLD-MCNC: 17 MG/DL (ref 7–18)
BUN/CREAT SERPL: 21 (ref 10–20)
CALCIUM BLD-MCNC: 8.5 MG/DL (ref 8.5–10.1)
CHLORIDE SERPL-SCNC: 111 MMOL/L (ref 98–112)
CO2 SERPL-SCNC: 21 MMOL/L (ref 21–32)
CREAT BLD-MCNC: 0.81 MG/DL (ref 0.55–1.02)
DEPRECATED RDW RBC AUTO: 43.8 FL (ref 35.1–46.3)
EOSINOPHIL # BLD AUTO: 0.07 X10(3) UL (ref 0–0.7)
EOSINOPHIL NFR BLD AUTO: 0.7 %
ERYTHROCYTE [DISTWIDTH] IN BLOOD BY AUTOMATED COUNT: 13.3 % (ref 11–15)
GLOBULIN PLAS-MCNC: 3.6 G/DL (ref 2.8–4.4)
GLUCOSE BLD-MCNC: 134 MG/DL (ref 70–99)
GLUCOSE UR STRIP.AUTO-MCNC: NEGATIVE MG/DL
HCT VFR BLD AUTO: 39.3 % (ref 35–48)
HGB BLD-MCNC: 13.3 G/DL (ref 12–16)
IMM GRANULOCYTES # BLD AUTO: 0.13 X10(3) UL (ref 0–1)
IMM GRANULOCYTES NFR BLD: 1.3 %
LYMPHOCYTES # BLD AUTO: 2.14 X10(3) UL (ref 1–4)
LYMPHOCYTES NFR BLD AUTO: 20.6 %
M PROTEIN MFR SERPL ELPH: 6.9 G/DL (ref 6.4–8.2)
MCH RBC QN AUTO: 30.6 PG (ref 26–34)
MCHC RBC AUTO-ENTMCNC: 33.8 G/DL (ref 31–37)
MCV RBC AUTO: 90.3 FL (ref 80–100)
MONOCYTES # BLD AUTO: 0.8 X10(3) UL (ref 0.1–1)
MONOCYTES NFR BLD AUTO: 7.7 %
NEUTROPHILS # BLD AUTO: 7.16 X10 (3) UL (ref 1.5–7.7)
NEUTROPHILS # BLD AUTO: 7.16 X10(3) UL (ref 1.5–7.7)
NEUTROPHILS NFR BLD AUTO: 69 %
NITRITE UR QL STRIP.AUTO: POSITIVE
OSMOLALITY SERPL CALC.SUM OF ELEC: 296 MOSM/KG (ref 275–295)
PH UR STRIP.AUTO: 5 [PH] (ref 4.5–8)
PLATELET # BLD AUTO: 187 10(3)UL (ref 150–450)
POTASSIUM SERPL-SCNC: 3.4 MMOL/L (ref 3.5–5.1)
PROT UR STRIP.AUTO-MCNC: NEGATIVE MG/DL
RBC # BLD AUTO: 4.35 X10(6)UL (ref 3.8–5.3)
RBC UR QL AUTO: NEGATIVE
SODIUM SERPL-SCNC: 141 MMOL/L (ref 136–145)
SP GR UR STRIP.AUTO: 1.02 (ref 1–1.03)
UROBILINOGEN UR STRIP.AUTO-MCNC: 2 MG/DL
WBC # BLD AUTO: 10.4 X10(3) UL (ref 4–11)
WBC #/AREA URNS AUTO: >50 /HPF

## 2020-07-10 PROCEDURE — 99285 EMERGENCY DEPT VISIT HI MDM: CPT

## 2020-07-10 PROCEDURE — 23500 CLTX CLAVICULAR FX W/O MNPJ: CPT

## 2020-07-10 PROCEDURE — 73502 X-RAY EXAM HIP UNI 2-3 VIEWS: CPT | Performed by: EMERGENCY MEDICINE

## 2020-07-10 PROCEDURE — 80053 COMPREHEN METABOLIC PANEL: CPT | Performed by: EMERGENCY MEDICINE

## 2020-07-10 PROCEDURE — 96361 HYDRATE IV INFUSION ADD-ON: CPT

## 2020-07-10 PROCEDURE — 85025 COMPLETE CBC W/AUTO DIFF WBC: CPT | Performed by: EMERGENCY MEDICINE

## 2020-07-10 PROCEDURE — 81001 URINALYSIS AUTO W/SCOPE: CPT | Performed by: EMERGENCY MEDICINE

## 2020-07-10 PROCEDURE — 96360 HYDRATION IV INFUSION INIT: CPT

## 2020-07-10 PROCEDURE — 72125 CT NECK SPINE W/O DYE: CPT | Performed by: EMERGENCY MEDICINE

## 2020-07-10 PROCEDURE — 73030 X-RAY EXAM OF SHOULDER: CPT | Performed by: EMERGENCY MEDICINE

## 2020-07-10 PROCEDURE — 70450 CT HEAD/BRAIN W/O DYE: CPT | Performed by: EMERGENCY MEDICINE

## 2020-07-10 RX ORDER — DONEPEZIL HYDROCHLORIDE 5 MG/1
5 TABLET, FILM COATED ORAL NIGHTLY
Qty: 30 TABLET | Refills: 2 | Status: SHIPPED | OUTPATIENT
Start: 2020-07-10

## 2020-07-10 RX ORDER — LEVOTHYROXINE SODIUM 0.1 MG/1
100 TABLET ORAL DAILY
Qty: 30 TABLET | Refills: 2 | Status: SHIPPED | OUTPATIENT
Start: 2020-07-10

## 2020-07-10 RX ORDER — QUINAPRIL 10 MG/1
10 TABLET ORAL
Qty: 30 TABLET | Refills: 2 | Status: SHIPPED | OUTPATIENT
Start: 2020-07-10

## 2020-07-10 RX ORDER — NITROFURANTOIN 25; 75 MG/1; MG/1
100 CAPSULE ORAL 2 TIMES DAILY
Qty: 14 CAPSULE | Refills: 0 | Status: SHIPPED | OUTPATIENT
Start: 2020-07-10 | End: 2020-07-17

## 2020-07-10 RX ORDER — ACETAMINOPHEN 500 MG
1000 TABLET ORAL ONCE
Status: COMPLETED | OUTPATIENT
Start: 2020-07-10 | End: 2020-07-10

## 2020-07-10 RX ORDER — NITROFURANTOIN 25; 75 MG/1; MG/1
100 CAPSULE ORAL ONCE
Status: COMPLETED | OUTPATIENT
Start: 2020-07-10 | End: 2020-07-10

## 2020-07-10 RX ORDER — SODIUM CHLORIDE 9 MG/ML
125 INJECTION, SOLUTION INTRAVENOUS CONTINUOUS
Status: DISCONTINUED | OUTPATIENT
Start: 2020-07-10 | End: 2020-07-10

## 2020-07-10 NOTE — CM/SW NOTE
[facility] DON @ [address] [phone] called  left asked for a return call. Cecilia ALEXANDER at [facility] called she is going to contact the [employer] on bed availability and call back.       [address] [phone]   [address] [place] [address]

## 2020-07-10 NOTE — CM/SW NOTE
I received a called from Carline Workman (pt's daughter) concerning her mom's visit to Dr Holly Valdivia. She said the office did not have her medication list. I tried calling his office and they closed at [address] [facility] today.  The [facility] will call in the morning and re-fax the informa

## 2020-07-10 NOTE — TELEPHONE ENCOUNTER
Lexx Aguilar ED case manager following up, patient was seen in ED yesterday, patient left rehab. She will be faxing over patient's med list and psych evaluation for PCP.

## 2020-07-10 NOTE — TELEPHONE ENCOUNTER
Called spoke with pts daughter, pts daughter states pt just had accident fall from stairs, paramedics at home at moment      Was unable to notify daughter meds were sent to pharmacy

## 2020-07-10 NOTE — CM/SW NOTE
[facility] referral accepted and Harry Fly from [address]. [phone] will call Daughter Barney Suh to schedule first appointment.

## 2020-07-10 NOTE — CM/SW NOTE
Patricio Mead and Deon Libman arrived in the ED entrance bay while in their car. I gave them the list of medications. I confirmed with Deon Libman that I can place referral for Home Health and she said yes.  I also asked Deon Libman if I could send the information form the ED

## 2020-07-10 NOTE — ED PROVIDER NOTES
Patient Seen in: [facility] Emergency Department      History   Patient presents with:  Fall    Stated Complaint: fall    HPI    This is a 44-year-old female who presents status post fall for evaluation of right shoulder pain.   She has a past medica Tobacco Use      Smoking status: Never Smoker      Smokeless tobacco: Never Used    Alcohol use: No    Drug use: No             Review of Systems    Positive for stated complaint: fall  Other systems are as noted in HPI.   Constitutional and vital signs re Ratio 0.9 (*)     All other components within normal limits   URINALYSIS WITH CULTURE REFLEX - Abnormal; Notable for the following components:    Urine Color Sanam (*)     Clarity Urine Hazy (*)     Ketones Urine Trace (*)     Nitrite Urine Positive (*) capsule/subcoracoid bursa. Calcification of the rotator cuff insertion suggesting chronic calcific tendinitis.     Dictated by (CST): Shaquille Dang MD on 7/09/2020 at 11:14 AM     Finalized by (CST): Shaquille Dang MD on 7/09/2020 at 11:15 AM          Xr chronic small vessel disease. The visualized paranasal sinuses and mastoid air cells are satisfactorily aerated. CONCLUSION:  No acute intracranial abnormality.    Dictated by: Abigail Shepherd MD on 7/10/2020 at 4:54 PM     Finalized by: Abigail Shepherd MD on 7/1 COMPARISON:  None. INDICATIONS:  fall  TECHNIQUE:  Noncontrast CT scanning of the cervical spine is performed from the skull base through C7. Multiplanar reconstructions are generated. Dose reduction techniques were used.  Dose information is transmitted Minimal degenerative anterolisthesis of C7 on T1, and T1 on T2 related to advanced facet arthrosis and moderate disc degeneration. Moderate to advanced multilevel facet arthrosis in the cervical and upper thoracic spine.   Degeneration between the anterior noted.  Reactive endplate changes are noted between T11 and T12 DISC SPACES: Disc spacers are seen at L4-L5 and L5-S1 with progressive narrowing of these disc spaces. There is narrowing of the visualized thoracic discs.  SACROILIAC JOINTS: Normal.  OTHER: scan brain showed no acute intra-abnormality. CT cervical spine show no acute fracture. X-ray right shoulder showed a distal clavicle fracture  X-ray right hip was negative        Sling was provided. Patient to comply cold compress and Tylenol for pain.
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
## 2020-07-10 NOTE — TELEPHONE ENCOUNTER
Sonya Morris from [address] requesting the visit summary from yesterdays visit to be faxed to them at [phone]

## 2020-07-10 NOTE — TELEPHONE ENCOUNTER
Called spoke with pts daughter in regards to message, verified med list again which was verified at office visit due to  ER f/u. pts daughter states mother is back home, nursing home did not release any meds to take home, wants to see if refills can be sen

## 2020-07-11 NOTE — CM/SW NOTE
Call back received from Angus Lopes at [facility] - transferred call to [facility] where patient currently is.

## 2020-07-11 NOTE — ED NOTES
Patient discharged to home with daughter. Patient given cane and sling applied to right arm. Patient alert and at baseline. No signs of acute distress.

## 2020-07-11 NOTE — CM/SW NOTE
Asked to see patient who fell today. Recent complex social history. Chart reviewed. Patient is a 71 yo female with dementia. Up until 8 months ago she was living with daughter Deanna Welsh.  Late 2019 Deanna Welsh decided she could not care for patient and had her

## 2020-07-23 ENCOUNTER — TELEPHONE (OUTPATIENT)
Dept: FAMILY MEDICINE CLINIC | Facility: CLINIC | Age: 80
End: 2020-07-23

## 2020-07-23 NOTE — TELEPHONE ENCOUNTER
Left message to inform Pt that she CAN NOT be  seen in office until we have COVID results back. She is scheduled for in office visit 7/24/2020 at 1.45pm . Pt informed that she can reschedule/change her appointment  to telephone visit.

## 2020-07-24 ENCOUNTER — OFFICE VISIT (OUTPATIENT)
Dept: FAMILY MEDICINE CLINIC | Facility: CLINIC | Age: 80
End: 2020-07-24
Payer: MEDICARE

## 2020-07-24 VITALS
BODY MASS INDEX: 25.71 KG/M2 | WEIGHT: 160 LBS | HEIGHT: 66 IN | DIASTOLIC BLOOD PRESSURE: 63 MMHG | SYSTOLIC BLOOD PRESSURE: 98 MMHG | HEART RATE: 108 BPM
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
## 2020-07-24 DIAGNOSIS — S42.034P: Primary | ICD-10-CM

## 2020-07-24 PROCEDURE — 99214 OFFICE O/P EST MOD 30 MIN: CPT | Performed by: FAMILY MEDICINE

## 2020-07-24 PROCEDURE — G0463 HOSPITAL OUTPT CLINIC VISIT: HCPCS | Performed by: FAMILY MEDICINE

## 2020-07-24 RX ORDER — ACETAMINOPHEN AND CODEINE PHOSPHATE 300; 30 MG/1; MG/1
1 TABLET ORAL EVERY 4 HOURS PRN
Qty: 30 TABLET | Refills: 1 | Status: ON HOLD | OUTPATIENT
Start: 2020-07-24 | End: 2020-08-11

## 2020-07-24 NOTE — TELEPHONE ENCOUNTER
Pt daughter calling back to state pt was not tested for covid and does not have symptoms. Per r/o covid tab below test was ordered on 7/10/20 and cancelled on 7/10/20.   There are no documented covid like symptoms from pt ER visit on 7/10/20 or notation

## 2020-07-24 NOTE — PROGRESS NOTES
HPI:    Patient ID: Rufino Man is a 70year old female.     HPI  Patient presents with:  Hospital F/U: follow up for fall on stairs, was in  [address] on 7/10/2020 for Closed nondisplaced fracture of right clavicle, unspecified part of clavicle bruising              ASSESSMENT/PLAN:   Closed nondisplaced fracture of acromial end of right clavicle with malunion, subsequent encounter  (primary encounter diagnosis)  Fall, subsequent encounter    Getting home health PT now. Pain controlled.  Able to

## 2020-07-28 PROCEDURE — G0180 MD CERTIFICATION HHA PATIENT: HCPCS | Performed by: FAMILY MEDICINE

## 2020-07-29 ENCOUNTER — MED REC SCAN ONLY (OUTPATIENT)
Dept: FAMILY MEDICINE CLINIC | Facility: CLINIC | Age: 80
End: 2020-07-29

## 2020-07-31 ENCOUNTER — MED REC SCAN ONLY (OUTPATIENT)
Dept: FAMILY MEDICINE CLINIC | Facility: CLINIC | Age: 80
End: 2020-07-31

## 2020-08-04 ENCOUNTER — OFFICE VISIT (OUTPATIENT)
Dept: FAMILY MEDICINE CLINIC | Facility: CLINIC | Age: 80
End: 2020-08-04
Payer: MEDICARE

## 2020-08-04 VITALS
TEMPERATURE: 98 F | HEIGHT: 66 IN | SYSTOLIC BLOOD PRESSURE: 120 MMHG | WEIGHT: 160 LBS | DIASTOLIC BLOOD PRESSURE: 74 MMHG | BODY MASS INDEX: 25.71 KG/M2 | HEART RATE: 88 BPM

## 2020-08-04 DIAGNOSIS — R41.0 CONFUSION: ICD-10-CM

## 2020-08-04 DIAGNOSIS — F41.9 ANXIETY: ICD-10-CM

## 2020-08-04 DIAGNOSIS — S42.034P: Primary | ICD-10-CM

## 2020-08-04 DIAGNOSIS — I10 BENIGN ESSENTIAL HTN: ICD-10-CM

## 2020-08-04 PROCEDURE — 99213 OFFICE O/P EST LOW 20 MIN: CPT | Performed by: FAMILY MEDICINE

## 2020-08-04 PROCEDURE — G0463 HOSPITAL OUTPT CLINIC VISIT: HCPCS | Performed by: FAMILY MEDICINE

## 2020-08-04 NOTE — PROGRESS NOTES
HPI:    Patient ID: Lynsey Whitley is a 70year old female. HPI  Patient presents with:  Hospital F/U: follow up from hospital for fall,    Pain: on RT shoulder also on clavicale from fall   feeling better. Has ortho follow up in a few weeks.    Mamta bedtime. She has not used yet. Follow up with Dr Chon Campos  No orders of the defined types were placed in this encounter.       Meds This Visit:  Requested Prescriptions      No prescriptions requested or ordered in this encounter       Imaging & Referrals:  None

## 2020-08-07 ENCOUNTER — NURSE TRIAGE (OUTPATIENT)
Dept: FAMILY MEDICINE CLINIC | Facility: CLINIC | Age: 80
End: 2020-08-07
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
## 2020-08-07 NOTE — TELEPHONE ENCOUNTER
Called [place] [employer] to follow-up on well check visit. Per [facility] police department contact was made with patient and she is okay.      Favio Webb

## 2020-08-08 ENCOUNTER — APPOINTMENT (OUTPATIENT)
Dept: CT IMAGING | Facility: HOSPITAL | Age: 80
End: 2020-08-08
Attending: EMERGENCY MEDICINE
Payer: MEDICARE

## 2020-08-08 PROBLEM — R45.1 AGITATION: Status: ACTIVE | Noted: 2020-08-08

## 2020-08-08 PROBLEM — N30.00 ACUTE CYSTITIS WITHOUT HEMATURIA: Status: ACTIVE | Noted: 2020-08-08

## 2020-08-08 PROCEDURE — 70450 CT HEAD/BRAIN W/O DYE: CPT | Performed by: EMERGENCY MEDICINE

## 2020-08-08 NOTE — CM/SW NOTE
TC from daughter Scott Gambino. She spoke with GEORGE from [address] [facility]. Ayla Maldonado states in order for patient to go there, guardianship would have to be in place.      Ayla Maldonado stated she would also leave a message for Karen Palacios from [facility]

## 2020-08-08 NOTE — H&P
[facility]  History and Physical     Inocencio Vo Patient Status:  Emergency    10/17/1940 MRN [id number]   Location [facility] Attending No att. providers found   Hosp Day # 0 PCP Nubia Villasenor DO     Chief Co smokeless tobacco. She reports that she does not drink alcohol or use drugs.     Family History:   Family History   Problem Relation Age of Onset   • Heart Attack Father    • Cancer Mother         stomac   • Diabetes Neg         Allergies:   Nsaids No tenderness or deformity. Abdomen: Soft, nontender, nondistended. Positive bowel sounds. No rebound, guarding or organomegaly. Neurologic: No focal neurological deficits. CNII-XII grossly intact. Musculoskeletal: Moves all extremities.   Extremities:

## 2020-08-08 NOTE — CM/SW NOTE
ED CM asked to see patient. Patient found wandering near her condo. Ms. Marc Schreiber is known to me from previous ED visits. She has a history of dementia. There is a Neuropsych report from 11/8/19 in [facility] documenting non-decisional status.      Timeline of ev patient wants to live in her condo. When I restated that patient cannot make an informed decision because of her dementia, Fabrice Amy stated that she \"does not get involved with legal decisions. She states her  is \"doing rounds\".  When asked what kind

## 2020-08-08 NOTE — PLAN OF CARE
NURSING ADMISSION NOTE      Patient admitted via Cart  Oriented to room. Safety precautions initiated. Bed in low position. Call light in reach. Admission navigator completed, with daughter Juliano Anisa at the bedside. Assumed care of patient around [phone].
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
## 2020-08-08 NOTE — ED NOTES
Pt ambulated to bathroom with assistance from this author, pt was steady on feet and was without complaint. Pt provided with new brief but later stated she did not need it. Pt now resting comfortably in bed, side rails up.

## 2020-08-08 NOTE — TELEPHONE ENCOUNTER
Patient was found wandering the streets this morning and was brought into [facility] by the paramedics (see emergency room notes). Patient will be placed in a long term dementia care facility per ER notes.      Julieth Albert
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
## 2020-08-08 NOTE — ED INITIAL ASSESSMENT (HPI)
Pt states she left home to go across street and ask for help,  States she is not getting meds at home, afraid

## 2020-08-08 NOTE — CM/SW NOTE
No call back from daughter Arabella Parker. Discussed with legal again. Best option for patient is to admit observation. ER MD aware and in agreement.

## 2020-08-08 NOTE — ED PROVIDER NOTES
Patient Seen in: [facility] Emergency Department      History   Patient presents with:   Other    Stated Complaint: wandering    HPI    68-year-old female was brought to the emergency department by paramedics after she was found agitated and wanderin 126/77   Pulse 73   Resp 16   Temp 97.9 °F (36.6 °C)   Temp src Temporal   SpO2 98 %   O2 Device        Current:/77   Pulse 72   Temp 97.9 °F (36.6 °C) (Temporal)   Resp 16   Wt 72.6 kg   LMP 08/28/1970 (LMP Unknown)   SpO2 98%   BMI 25.83 kg/m² Please view results for these tests on the individual orders. DRUG SCREEN 7 W/OUT CONFIRMATION, URINE    Narrative:     Results of the Urine Drug Screen should be used only for medical purposes.    URINE CULTURE, ROUTINE   CBC W/ DIFFERENTIAL abnormality. Based on diagnostic testing, the patient does not merit medical hospitalization. She does have a urinary tract infection and antibiotics were initiated.   Despite a history of dementia she does not appear to have an acute psychiatric condit

## 2020-08-09 NOTE — PLAN OF CARE
Assumed care at [address]. Pt is A&O x2, oriented to self and location. Pt deemed non decisional since 2019. Forgetful and confused at times. Hx of dementia. On Ra, . No tele. Denies pain. SCDs on.    Social work on consult to evaluate placement of

## 2020-08-09 NOTE — PROGRESS NOTES
[facility] Resource Referral Counselor Note    Siena Patiño Patient Status:  Observation    10/17/1940 MRN [id number]   [facility] 3NE-A Attending Sanju Dickerson MD   Hosp Day # 0 PCP DO RYAN Madrid(subjective) \

## 2020-08-09 NOTE — PROGRESS NOTES
08/09/20 1408   Clinical Encounter Type   Visited With Patient   Routine Visit Introduction  (patient unable to clearly engage in POA discussion)   Continue Visiting No   Patient Spiritual Encounters   Spiritual Needs patient presents spiritual strength

## 2020-08-09 NOTE — PROGRESS NOTES
[facility]  Progress Note     Scott Spicer Patient Status:  Observation    10/17/1940 MRN [id number]   [facility] 3NE-A Attending Jody Hawkins MD   Hosp Day # 0 PCP Terrance Anthony DO     Chief Complaint: AMS    S: Patient r Oral Daily   • lisinopril  10 mg Oral Daily   • Donepezil HCl  5 mg Oral Nightly   • Insulin Aspart Pen  2-10 Units Subcutaneous TID CC and HS   • enoxaparin  40 mg Subcutaneous Daily       ASSESSMENT / PLAN:     1. Agitation, acute encephalopathy  1.  Susp

## 2020-08-09 NOTE — CM/SW NOTE
Patient's daughter Bryce Lundberg will be unavailable for an undetermined amount of time. She has been hospitalized.

## 2020-08-09 NOTE — PLAN OF CARE
Patient is alert to self only. Needs frequent reorientation to situation. Calm, cooperative, follows commands. Not telemetry monitored. VSS, afebrile. On RA. Denies pain. BG monitored prior to meals, wnl.    Ambulating with standby assistance, st

## 2020-08-09 NOTE — PROGRESS NOTES
Patient BP 97/48 at 0930, patient asymptomatic. AM lisinopril dose held and hospitalist notified. New order for 0.9 NS IVF. Will monitor.

## 2020-08-10 ENCOUNTER — HOME HEALTH CHARGES (OUTPATIENT)
Dept: FAMILY MEDICINE CLINIC | Facility: CLINIC | Age: 80
End: 2020-08-10

## 2020-08-10 DIAGNOSIS — R53.1 GENERAL WEAKNESS: ICD-10-CM

## 2020-08-10 DIAGNOSIS — F03.90 MAJOR NEUROCOGNITIVE DISORDER (HCC): ICD-10-CM

## 2020-08-10 DIAGNOSIS — Z91.81 AT RISK FOR FALLING: Primary | ICD-10-CM

## 2020-08-10 DIAGNOSIS — M48.061 SPINAL STENOSIS OF LUMBAR REGION WITHOUT NEUROGENIC CLAUDICATION: ICD-10-CM

## 2020-08-10 NOTE — PLAN OF CARE
Alert to self. Forgeful, hx of dementia. Cooperative. Room air. Voids. Continent. Denies pain. IVF on.  Up 1 standby. Carb control diet w/ QID sugars. SW following for placement. Needs met, will monitor.     Problem: Patient/Family Goals  Goal: Patie

## 2020-08-10 NOTE — PROGRESS NOTES
[facility]  Progress Note     Alanis Dryer Patient Status:  Observation    10/17/1940 MRN [id number]   [facility] 3NE-A Attending Lizet Monahan MD   Hosp Day # 0 PCP Eder Gonsalez DO     Chief Complaint: AMS    S: Patient r • lisinopril  10 mg Oral Daily   • Donepezil HCl  5 mg Oral Nightly   • Insulin Aspart Pen  2-10 Units Subcutaneous TID CC and HS   • enoxaparin  40 mg Subcutaneous Daily       ASSESSMENT / PLAN:     1. Agitation, acute encephalopathy  1.  Suspect deliriu

## 2020-08-10 NOTE — TELEPHONE ENCOUNTER
Patient currently admitted in hospital.   Social work following patient for long term care facility placement.

## 2020-08-10 NOTE — CM/SW NOTE
9:20am  MSW spoke to Adult Protective Services   Pt admitted because her Dtr Patricia He who is the care taker is in a psych hospital. And then pt was found wandering.  Pt has hx [facility] [place] ([address] [facility] [phone])QIA dtr Patricia Cutler moved her out an

## 2020-08-10 NOTE — PLAN OF CARE
Patient alert to self and able to state she is in the hospital. Remains confused and forgetful but easily redirectable. On room air, lungs clear. Abdomen soft and nontender, BS active. Denies any pain or discomfort. IV fluids infusing- 0.9 NS @ 100 mL/hr.

## 2020-08-11 NOTE — CM/SW NOTE
MSW called  Dtalsysa Stewart [phone] left to discuss dc plan,VM @ 11:08am  2nd message left at 2:15pm   3rd message at 3:38pm    Dtr Callie Stewart called and left message after 4:30pm-MSW called back at 4:37pm and left message. MSW spoke to [address] [facility]

## 2020-08-11 NOTE — PLAN OF CARE
Patient alert to self, pleasantly confused- easily redirectable. On room air, lungs clear. Abdomen soft and nontender, BS active. Up with standby assist to the bathroom- voiding with out difficulty. Denies any pain or discomfort.  IV fluids infusing- 0.9 NS

## 2020-08-11 NOTE — PLAN OF CARE
Patient alert to self, confused at times, hx of dementia. Room air. NSR on tele. Denies pain. Up with standby assist. Ambulated through the hallways, with SBA. Accucheck qid, Carb controlled diet. Electrolyte protocol.  IV fluid infusing, 0.9% at 100 mL in

## 2020-08-11 NOTE — PROGRESS NOTES
[facility]  Progress Note     Beverly Webber Patient Status:  Observation    10/17/1940 MRN [id number]   [facility] 3NE-A Attending Shira Hart MD   Hosp Day # 0 PCP Nicolasa Swenson DO     Chief Complaint: AMS    S: Patient r Donepezil HCl  5 mg Oral Nightly   • Insulin Aspart Pen  2-10 Units Subcutaneous TID CC and HS   • enoxaparin  40 mg Subcutaneous Daily       ASSESSMENT / PLAN:     1. Agitation, acute encephalopathy  1.  Suspect delirium on dementia or baseline mental stat

## 2020-08-12 NOTE — PLAN OF CARE
Pt is A&O x1-2. VSS- not on tele. SpO2 remains stable on RA. Lung sounds clear. Pt c/o mild headache- PRN tylenol given with positive results. Pt denies nausea/SOB/dizziness. Pt can be impulsive at times. Fall precautions in place.  Briefed d/t mixed incont

## 2020-08-12 NOTE — CM/SW NOTE
Dtr Jes Sherry left message that she wants her mother dc'd to [facility]. MSW spoke to bedside Rn who confirms this and states pt can dc today. Per [facility] system pt is accepted to [facility], and family already dropped off clothes.   Message left with dc time for Dtr at 9

## 2020-08-12 NOTE — PLAN OF CARE
Assumed patient care at 7:30. A/O x1. Room air No tele ordered. Denies of pain. SBA. Electrolyte protocol. Carb control 1800. QID accu check. Right wrist 0.9 at 100. All safety precautions are in place. Will continue to monitor the patient.        Problem:

## 2020-08-12 NOTE — DISCHARGE SUMMARY
[facility]  Discharge Summary    Doug Sierra Patient Status:  Observation    10/17/1940 MRN [id number]   [facility] 3NE-A Attending No att. providers found   Hosp Day # 0 PCP Cesar Hanson DO     Date of Admission: 2020 nursing facility placement at [facility] due to patient's advanced dementia. Agitation improved and patient calm.   Discharge to skilled nursing facility in stable condition          TCM Diagnosis at discharge from Hospital: Other: see above; no T Date Arrival Time Visit Type Length Department Provider     9/3/2020 11:00 AM  FOLLOW UP [16] 15 min.  Juliet Shelton MD    Patient Instructions:          Location Instructions:      [address]

## 2020-08-12 NOTE — DISCHARGE PLANNING
NURSING DISCHARGE NOTE    Discharged Rehab facility via Ambulance. Accompanied by Support staff  Belongings Returned to patient from safe. Patient was educated about medications and future follow ups. IV was removed at time of discharged.  Answered all

## 2020-08-15 NOTE — PROGRESS NOTES
[address]  [facility] Author: Renzo Serna MD     10/17/1940 MRN [id number]   [facility]  Admission 20      Last Hospital Discharge 20 PCP Ethel Chang, [address] [facility] of Discharge 20       Date of Admission hours as needed for Pain. No current facility-administered medications on file prior to visit.          HISTORY:  She  has a past medical history of Back pain, Back problem, Colon polyp, Dementia ([address] Diabetes ([facility] [address] Disorder of thyroid, Essential hype Physical Exam  Vitals signs and nursing note reviewed. Constitutional:       General: She is not in acute distress. Appearance: She is well-developed. She is not diaphoretic. HENT:      Head: Normocephalic and atraumatic.       Nose: Nose norm Judgment normal.             Medication Reviewed: in Epic and [employer]idable and Imaging: Xr Clavicle, Complete, Right (cpt=73000)    Result Date: 7/16/2020     2 views of clavicle show severe AC joint arthritis and GH arthritis.  She has a minima Time: 08/12/20  8:18 AM   Result Value Ref Range    POC Glucose 101 (H) 70 - 99 mg/dL   RAPID SARS-COV-2 BY PCR    Collection Time: 08/12/20 11:20 AM   Result Value Ref Range    Rapid SARS-CoV-2 by PCR Not Detected Not Detected           ASSESSMENT/ PLAN:

## 2020-08-17 ENCOUNTER — INITIAL APN SNF VISIT (OUTPATIENT)
Dept: INTERNAL MEDICINE CLINIC | Age: 80
End: 2020-08-17

## 2020-08-17 VITALS
OXYGEN SATURATION: 98 % | SYSTOLIC BLOOD PRESSURE: 122 MMHG | RESPIRATION RATE: 20 BRPM | DIASTOLIC BLOOD PRESSURE: 74 MMHG | TEMPERATURE: 99 F | HEART RATE: 82 BPM

## 2020-08-17 DIAGNOSIS — E03.9 HYPOTHYROIDISM, UNSPECIFIED TYPE: ICD-10-CM

## 2020-08-17 DIAGNOSIS — R53.1 WEAKNESS GENERALIZED: ICD-10-CM

## 2020-08-17 DIAGNOSIS — Z74.09 IMPAIRED MOBILITY AND ADLS: ICD-10-CM

## 2020-08-17 DIAGNOSIS — M48.061 SPINAL STENOSIS OF LUMBAR REGION, UNSPECIFIED WHETHER NEUROGENIC CLAUDICATION PRESENT: ICD-10-CM

## 2020-08-17 DIAGNOSIS — M15.9 OSTEOARTHRITIS OF MULTIPLE JOINTS, UNSPECIFIED OSTEOARTHRITIS TYPE: ICD-10-CM

## 2020-08-17 DIAGNOSIS — F41.9 ANXIETY: ICD-10-CM

## 2020-08-17 DIAGNOSIS — F03.90 MAJOR NEUROCOGNITIVE DISORDER (HCC): ICD-10-CM

## 2020-08-17 DIAGNOSIS — I10 BENIGN ESSENTIAL HTN: ICD-10-CM

## 2020-08-17 DIAGNOSIS — R45.1 AGITATION: ICD-10-CM

## 2020-08-17 DIAGNOSIS — Z78.9 IMPAIRED MOBILITY AND ADLS: ICD-10-CM

## 2020-08-17 DIAGNOSIS — G93.40 ACUTE ENCEPHALOPATHY: Primary | ICD-10-CM

## 2020-08-17 PROCEDURE — 99310 SBSQ NF CARE HIGH MDM 45: CPT | Performed by: NURSE PRACTITIONER

## 2020-08-17 PROCEDURE — 1111F DSCHRG MED/CURRENT MED MERGE: CPT | Performed by: NURSE PRACTITIONER

## 2020-08-17 NOTE — PROGRESS NOTES
Alanis Cardona  : 10/17/1940  Age 70year old  female patient is admitted to Facility: [address] [facility] [address] for  [address] [place] [address] date:    2020  Discharge date to [facility]:    2020  ELOS:    10-14 days  Anticipated discharge date:    8 • Colon polyp    • Dementia (HCC)    • Diabetes ([facility] [address]    • Disorder of thyroid    • Essential hypertension    • H. pylori infection    • High blood pressure    • Osteoarthritis    • Thyroid disease    • Ulcer of the stomach and intestine      Past Surgic Oral Tab Take 650 mg by mouth every 6 (six) hours as needed for Pain.            VITALS:  /74   Pulse 82   Temp 98.6 °F (37 °C)   Resp 20   LMP 08/28/1970 (LMP Unknown)   SpO2 98%      REVIEW OF SYSTEMS:  GENERAL HEALTH:feels well otherwise  SKIN: den organomegaly, no masses; no bruits; nontender, no guarding, no rebound tenderness. :Deferred  LYMPHATIC:no lymphedema  MUSCULOSKELETAL: no acute synovitis upper or lower extremity.   Weakness R/T recent hospitalization/diagnoses/sequelae; will undergo th consult psych prn  3. ST eval and tx  4. Donepezil 5 mg q HS  5. Sertraline 50 mg daily  6. Deemed NONdecisional in 2019 by neuropsych    Weakness/Impaired mobility and ADLs  1. PT/OT eval and tx  2. ELOS 10-14 days  3.  Plan DC on or before 8.22.2020; ASHLYN palma

## 2020-08-18 ENCOUNTER — NURSE ONLY (OUTPATIENT)
Dept: LAB | Age: 80
End: 2020-08-18
Attending: FAMILY MEDICINE
Payer: MEDICARE

## 2020-08-18 DIAGNOSIS — R69 DIAGNOSIS UNKNOWN: Primary | ICD-10-CM

## 2020-08-24 ENCOUNTER — SNF VISIT (OUTPATIENT)
Dept: INTERNAL MEDICINE CLINIC | Age: 80
End: 2020-08-24

## 2020-08-24 VITALS
TEMPERATURE: 98 F | DIASTOLIC BLOOD PRESSURE: 59 MMHG | RESPIRATION RATE: 16 BRPM | SYSTOLIC BLOOD PRESSURE: 115 MMHG | HEART RATE: 68 BPM | OXYGEN SATURATION: 94 %

## 2020-08-24 DIAGNOSIS — F32.0 CURRENT MILD EPISODE OF MAJOR DEPRESSIVE DISORDER, UNSPECIFIED WHETHER RECURRENT (HCC): ICD-10-CM

## 2020-08-24 DIAGNOSIS — F03.90 MAJOR NEUROCOGNITIVE DISORDER (HCC): ICD-10-CM

## 2020-08-24 DIAGNOSIS — G93.40 ACUTE ENCEPHALOPATHY: Primary | ICD-10-CM

## 2020-08-24 PROCEDURE — 99308 SBSQ NF CARE LOW MDM 20: CPT | Performed by: NURSE PRACTITIONER

## 2020-08-24 NOTE — PROGRESS NOTES
Beverly Webber, 10/17/1940, 70year old, female    Chief Complaint:  Patient presents with:   Follow - Up: Acute encephalopathy 2/2 advancing Dementia w/ agitation  Depression       Subjective:    PMH significant for Dementia w/ agitation, HTN, HL, T2 DM intact; no sensorimotor deficit, cranial nerves intact II-XII, follows commands, +word finding difficulty  PSYCHIATRIC: ---Alert and oriented to person and year, able to state correct , knows she is not at home but struggles to find the words to describ

## 2020-08-25 ENCOUNTER — NURSE ONLY (OUTPATIENT)
Dept: LAB | Age: 80
End: 2020-08-25
Attending: FAMILY MEDICINE
Payer: MEDICARE

## 2020-08-25 DIAGNOSIS — S83.91XD SPRAIN OF RIGHT KNEE, SUBSEQUENT ENCOUNTER: Primary | ICD-10-CM

## 2020-08-25 LAB
ALBUMIN SERPL-MCNC: 3.3 G/DL (ref 3.4–5)
ALBUMIN/GLOB SERPL: 0.9 {RATIO} (ref 1–2)
ALP LIVER SERPL-CCNC: 108 U/L (ref 55–142)
ALT SERPL-CCNC: 19 U/L (ref 13–56)
ANION GAP SERPL CALC-SCNC: 5 MMOL/L (ref 0–18)
AST SERPL-CCNC: 16 U/L (ref 15–37)
BASOPHILS # BLD AUTO: 0.08 X10(3) UL (ref 0–0.2)
BASOPHILS NFR BLD AUTO: 1.1 %
BILIRUB SERPL-MCNC: 0.3 MG/DL (ref 0.1–2)
BUN BLD-MCNC: 19 MG/DL (ref 7–18)
BUN/CREAT SERPL: 24.7 (ref 10–20)
CALCIUM BLD-MCNC: 9.3 MG/DL (ref 8.5–10.1)
CHLORIDE SERPL-SCNC: 107 MMOL/L (ref 98–112)
CO2 SERPL-SCNC: 27 MMOL/L (ref 21–32)
CREAT BLD-MCNC: 0.77 MG/DL (ref 0.55–1.02)
DEPRECATED RDW RBC AUTO: 45 FL (ref 35.1–46.3)
EOSINOPHIL # BLD AUTO: 0.17 X10(3) UL (ref 0–0.7)
EOSINOPHIL NFR BLD AUTO: 2.2 %
ERYTHROCYTE [DISTWIDTH] IN BLOOD BY AUTOMATED COUNT: 13.2 % (ref 11–15)
GLOBULIN PLAS-MCNC: 3.8 G/DL (ref 2.8–4.4)
GLUCOSE BLD-MCNC: 94 MG/DL (ref 70–99)
HCT VFR BLD AUTO: 42.7 % (ref 35–48)
HGB BLD-MCNC: 14.1 G/DL (ref 12–16)
IMM GRANULOCYTES # BLD AUTO: 0.02 X10(3) UL (ref 0–1)
IMM GRANULOCYTES NFR BLD: 0.3 %
LYMPHOCYTES # BLD AUTO: 2.36 X10(3) UL (ref 1–4)
LYMPHOCYTES NFR BLD AUTO: 31.1 %
M PROTEIN MFR SERPL ELPH: 7.1 G/DL (ref 6.4–8.2)
MCH RBC QN AUTO: 30.9 PG (ref 26–34)
MCHC RBC AUTO-ENTMCNC: 33 G/DL (ref 31–37)
MCV RBC AUTO: 93.4 FL (ref 80–100)
MONOCYTES # BLD AUTO: 0.64 X10(3) UL (ref 0.1–1)
MONOCYTES NFR BLD AUTO: 8.4 %
NEUTROPHILS # BLD AUTO: 4.32 X10 (3) UL (ref 1.5–7.7)
NEUTROPHILS # BLD AUTO: 4.32 X10(3) UL (ref 1.5–7.7)
NEUTROPHILS NFR BLD AUTO: 56.9 %
OSMOLALITY SERPL CALC.SUM OF ELEC: 290 MOSM/KG (ref 275–295)
PATIENT FASTING Y/N/NP: YES
PLATELET # BLD AUTO: 217 10(3)UL (ref 150–450)
POTASSIUM SERPL-SCNC: 3.8 MMOL/L (ref 3.5–5.1)
RBC # BLD AUTO: 4.57 X10(6)UL (ref 3.8–5.3)
SODIUM SERPL-SCNC: 139 MMOL/L (ref 136–145)
T4 FREE SERPL-MCNC: 1.4 NG/DL (ref 0.8–1.7)
TSI SER-ACNC: 0.19 MIU/ML (ref 0.36–3.74)
WBC # BLD AUTO: 7.6 X10(3) UL (ref 4–11)

## 2020-08-25 PROCEDURE — 84443 ASSAY THYROID STIM HORMONE: CPT

## 2020-08-25 PROCEDURE — 36415 COLL VENOUS BLD VENIPUNCTURE: CPT

## 2020-08-25 PROCEDURE — 84439 ASSAY OF FREE THYROXINE: CPT

## 2020-08-25 PROCEDURE — 80053 COMPREHEN METABOLIC PANEL: CPT

## 2020-08-25 PROCEDURE — 85025 COMPLETE CBC W/AUTO DIFF WBC: CPT

## 2020-08-26 ENCOUNTER — SNF VISIT (OUTPATIENT)
Dept: INTERNAL MEDICINE CLINIC | Age: 80
End: 2020-08-26

## 2020-08-26 VITALS
RESPIRATION RATE: 18 BRPM | SYSTOLIC BLOOD PRESSURE: 134 MMHG | OXYGEN SATURATION: 95 % | HEART RATE: 76 BPM | DIASTOLIC BLOOD PRESSURE: 62 MMHG | TEMPERATURE: 98 F

## 2020-08-26 DIAGNOSIS — F03.90 MAJOR NEUROCOGNITIVE DISORDER (HCC): ICD-10-CM

## 2020-08-26 DIAGNOSIS — R45.1 AGITATION: ICD-10-CM

## 2020-08-26 DIAGNOSIS — G93.40 ACUTE ENCEPHALOPATHY: Primary | ICD-10-CM

## 2020-08-26 DIAGNOSIS — F41.9 ANXIETY: ICD-10-CM

## 2020-08-26 PROCEDURE — 99308 SBSQ NF CARE LOW MDM 20: CPT | Performed by: NURSE PRACTITIONER

## 2020-08-26 NOTE — PROGRESS NOTES
Linda Kimball, 10/17/1940, 70year old, female    Chief Complaint:  Patient presents with:   Follow - Up: Acute encephalopathy 2/2 advancing Dementia w/ agitation  Anxiety       Subjective:    PMH significant for Dementia w/ agitation, HTN, HL, T2 DM, H acute synovitis upper or lower extremity.   Weakness R/T recent hospitalization/diagnoses/sequelae; will undergo therapies to rehab and improve strength, endurance and independence w/ ADLs  EXTREMITIES/VASCULAR:no cyanosis, clubbing or edema, radial pulses ups:  PCP <7 days after DC from [address] [place] [address], APRN  8/26/2020  9:20  AM

## 2020-08-27 NOTE — PROGRESS NOTES
W180  [facility] Author: Rebecca Mcbride MD     10/17/1940 MRN [id number]   [facility]  [facility]      Last Hospital Discharge 20 PCP Lisy Garcia, [address] [facility] of Discharge 20       Date of Admission every 6 (six) hours as needed for Pain. No current facility-administered medications on file prior to visit.          HISTORY:  She  has a past medical history of Back pain, Back problem, Colon polyp, Dementia ([facility] [address] Diabetes ([facility] [address] Disorder of thyroid 0.9 oz (72.6 kg). Physical Exam  Vitals signs and nursing note reviewed. Constitutional:       General: She is not in acute distress. Appearance: She is well-developed. She is not diaphoretic. HENT:      Head: Normocephalic and atraumatic. Judgment: Judgment normal.             Medication Reviewed: in Epic and Global[facility] and Imaging: Xr Clavicle, Complete, Right (cpt=73000)    Result Date: 7/16/2020     2 views of clavicle show severe AC joint arthritis and GH arthritis.  She - 145 mmol/L    Potassium 3.8 3.5 - 5.1 mmol/L    Chloride 107 98 - 112 mmol/L    CO2 27.0 21.0 - 32.0 mmol/L    Anion Gap 5 0 - 18 mmol/L    BUN 19 (H) 7 - 18 mg/dL    Creatinine 0.77 0.55 - 1.02 mg/dL    BUN/CREA Ratio 24.7 (H) 10.0 - 20.0    Calcium, To incontinence  Osteoarthritis of multiple joints, unspecified osteoarthritis type  Unsteady gait  Spondylolisthesis of lumbar region  Mood disorder in conditions classified elsewhere  Spinal stenosis of lumbar region, unspecified whether neurogenic claudica

## 2020-08-31 ENCOUNTER — NURSE ONLY (OUTPATIENT)
Dept: LAB | Age: 80
End: 2020-08-31
Attending: FAMILY MEDICINE
Payer: MEDICARE

## 2020-08-31 ENCOUNTER — MED REC SCAN ONLY (OUTPATIENT)
Dept: FAMILY MEDICINE CLINIC | Facility: CLINIC | Age: 80
End: 2020-08-31

## 2020-08-31 ENCOUNTER — SNF VISIT (OUTPATIENT)
Dept: INTERNAL MEDICINE CLINIC | Age: 80
End: 2020-08-31

## 2020-08-31 VITALS
TEMPERATURE: 97 F | SYSTOLIC BLOOD PRESSURE: 123 MMHG | RESPIRATION RATE: 18 BRPM | DIASTOLIC BLOOD PRESSURE: 56 MMHG | HEART RATE: 65 BPM | OXYGEN SATURATION: 96 %

## 2020-08-31 DIAGNOSIS — G93.40 ACUTE ENCEPHALOPATHY: Primary | ICD-10-CM

## 2020-08-31 DIAGNOSIS — F03.90 MAJOR NEUROCOGNITIVE DISORDER (HCC): ICD-10-CM

## 2020-08-31 DIAGNOSIS — R69 DIAGNOSIS UNKNOWN: Primary | ICD-10-CM

## 2020-08-31 DIAGNOSIS — F41.9 ANXIETY: ICD-10-CM

## 2020-08-31 DIAGNOSIS — R45.1 AGITATION: ICD-10-CM

## 2020-08-31 LAB
ALBUMIN SERPL-MCNC: 3.3 G/DL (ref 3.4–5)
ALBUMIN/GLOB SERPL: 0.9 {RATIO} (ref 1–2)
ALP LIVER SERPL-CCNC: 107 U/L (ref 55–142)
ALT SERPL-CCNC: 19 U/L (ref 13–56)
ANION GAP SERPL CALC-SCNC: 2 MMOL/L (ref 0–18)
AST SERPL-CCNC: 22 U/L (ref 15–37)
BASOPHILS # BLD AUTO: 0.07 X10(3) UL (ref 0–0.2)
BASOPHILS NFR BLD AUTO: 1.1 %
BILIRUB SERPL-MCNC: 0.4 MG/DL (ref 0.1–2)
BUN BLD-MCNC: 15 MG/DL (ref 7–18)
BUN/CREAT SERPL: 18.1 (ref 10–20)
CALCIUM BLD-MCNC: 9.7 MG/DL (ref 8.5–10.1)
CHLORIDE SERPL-SCNC: 110 MMOL/L (ref 98–112)
CO2 SERPL-SCNC: 26 MMOL/L (ref 21–32)
CREAT BLD-MCNC: 0.83 MG/DL (ref 0.55–1.02)
DEPRECATED RDW RBC AUTO: 46 FL (ref 35.1–46.3)
EOSINOPHIL # BLD AUTO: 0.17 X10(3) UL (ref 0–0.7)
EOSINOPHIL NFR BLD AUTO: 2.8 %
ERYTHROCYTE [DISTWIDTH] IN BLOOD BY AUTOMATED COUNT: 13.1 % (ref 11–15)
GLOBULIN PLAS-MCNC: 3.8 G/DL (ref 2.8–4.4)
GLUCOSE BLD-MCNC: 86 MG/DL (ref 70–99)
HCT VFR BLD AUTO: 43.6 % (ref 35–48)
HGB BLD-MCNC: 14 G/DL (ref 12–16)
IMM GRANULOCYTES # BLD AUTO: 0.02 X10(3) UL (ref 0–1)
IMM GRANULOCYTES NFR BLD: 0.3 %
LYMPHOCYTES # BLD AUTO: 1.8 X10(3) UL (ref 1–4)
LYMPHOCYTES NFR BLD AUTO: 29.1 %
M PROTEIN MFR SERPL ELPH: 7.1 G/DL (ref 6.4–8.2)
MCH RBC QN AUTO: 30.6 PG (ref 26–34)
MCHC RBC AUTO-ENTMCNC: 32.1 G/DL (ref 31–37)
MCV RBC AUTO: 95.4 FL (ref 80–100)
MONOCYTES # BLD AUTO: 0.6 X10(3) UL (ref 0.1–1)
MONOCYTES NFR BLD AUTO: 9.7 %
NEUTROPHILS # BLD AUTO: 3.52 X10 (3) UL (ref 1.5–7.7)
NEUTROPHILS # BLD AUTO: 3.52 X10(3) UL (ref 1.5–7.7)
NEUTROPHILS NFR BLD AUTO: 57 %
OSMOLALITY SERPL CALC.SUM OF ELEC: 286 MOSM/KG (ref 275–295)
PATIENT FASTING Y/N/NP: YES
PLATELET # BLD AUTO: 209 10(3)UL (ref 150–450)
POTASSIUM SERPL-SCNC: 4.1 MMOL/L (ref 3.5–5.1)
RBC # BLD AUTO: 4.57 X10(6)UL (ref 3.8–5.3)
SODIUM SERPL-SCNC: 138 MMOL/L (ref 136–145)
WBC # BLD AUTO: 6.2 X10(3) UL (ref 4–11)

## 2020-08-31 PROCEDURE — 99308 SBSQ NF CARE LOW MDM 20: CPT | Performed by: NURSE PRACTITIONER

## 2020-08-31 PROCEDURE — 36415 COLL VENOUS BLD VENIPUNCTURE: CPT

## 2020-08-31 PROCEDURE — 85025 COMPLETE CBC W/AUTO DIFF WBC: CPT

## 2020-08-31 PROCEDURE — 80053 COMPREHEN METABOLIC PANEL: CPT
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
## 2020-09-04 ENCOUNTER — SNF VISIT (OUTPATIENT)
Dept: INTERNAL MEDICINE CLINIC | Age: 80
End: 2020-09-04

## 2020-09-04 VITALS — HEART RATE: 70 BPM | OXYGEN SATURATION: 94 %

## 2020-09-04 DIAGNOSIS — R53.1 WEAKNESS GENERALIZED: ICD-10-CM

## 2020-09-04 DIAGNOSIS — F41.9 ANXIETY: ICD-10-CM

## 2020-09-04 DIAGNOSIS — F03.90 MAJOR NEUROCOGNITIVE DISORDER (HCC): ICD-10-CM

## 2020-09-04 DIAGNOSIS — Z74.09 IMPAIRED MOBILITY AND ADLS: ICD-10-CM

## 2020-09-04 DIAGNOSIS — Z78.9 IMPAIRED MOBILITY AND ADLS: ICD-10-CM

## 2020-09-04 DIAGNOSIS — R45.1 AGITATION: ICD-10-CM

## 2020-09-04 DIAGNOSIS — G93.40 ACUTE ENCEPHALOPATHY: Primary | ICD-10-CM

## 2020-09-04 PROCEDURE — 99308 SBSQ NF CARE LOW MDM 20: CPT | Performed by: NURSE PRACTITIONER

## 2020-09-04 NOTE — PROGRESS NOTES
Siena Patiño, 10/17/1940, 70year old, female    Chief Complaint:  Patient presents with:   Follow - Up: Acute encephalopathy 2/2 advancing Dementia w/ agitation  Weakness       Subjective:    PMH significant for Dementia w/ agitation, HTN, HL, T2 DM, synovitis upper or lower extremity.   Weakness R/T recent hospitalization/diagnoses/sequelae; will undergo therapies to rehab and improve strength, endurance and independence w/ ADLs  EXTREMITIES/VASCULAR:no cyanosis, clubbing or edema, radial pulses 2+ and daily     LABS  CBC/CMP weekly while in [facility]     Follow ups:  PCP <7 days after DC from [address] [place] [address], SADIA  9/4/2020  10:20  AM

## 2020-09-07 ENCOUNTER — NURSE ONLY (OUTPATIENT)
Dept: LAB | Age: 80
End: 2020-09-07
Attending: FAMILY MEDICINE
Payer: MEDICARE

## 2020-09-07 DIAGNOSIS — R69 DIAGNOSIS UNKNOWN: Primary | ICD-10-CM

## 2020-09-08 ENCOUNTER — NURSE ONLY (OUTPATIENT)
Dept: LAB | Age: 80
End: 2020-09-08
Attending: FAMILY MEDICINE
Payer: MEDICARE

## 2020-09-08 DIAGNOSIS — R69 DIAGNOSIS UNKNOWN: Primary | ICD-10-CM

## 2020-09-08 LAB
ALBUMIN SERPL-MCNC: 3.4 G/DL (ref 3.4–5)
ALBUMIN/GLOB SERPL: 0.9 {RATIO} (ref 1–2)
ALP LIVER SERPL-CCNC: 96 U/L (ref 55–142)
ALT SERPL-CCNC: 19 U/L (ref 13–56)
ANION GAP SERPL CALC-SCNC: 6 MMOL/L (ref 0–18)
AST SERPL-CCNC: 17 U/L (ref 15–37)
BASOPHILS # BLD AUTO: 0.06 X10(3) UL (ref 0–0.2)
BASOPHILS NFR BLD AUTO: 0.8 %
BILIRUB SERPL-MCNC: 0.5 MG/DL (ref 0.1–2)
BUN BLD-MCNC: 14 MG/DL (ref 7–18)
BUN/CREAT SERPL: 21.2 (ref 10–20)
CALCIUM BLD-MCNC: 10 MG/DL (ref 8.5–10.1)
CHLORIDE SERPL-SCNC: 106 MMOL/L (ref 98–112)
CO2 SERPL-SCNC: 28 MMOL/L (ref 21–32)
CREAT BLD-MCNC: 0.66 MG/DL (ref 0.55–1.02)
DEPRECATED RDW RBC AUTO: 43.8 FL (ref 35.1–46.3)
EOSINOPHIL # BLD AUTO: 0.18 X10(3) UL (ref 0–0.7)
EOSINOPHIL NFR BLD AUTO: 2.5 %
ERYTHROCYTE [DISTWIDTH] IN BLOOD BY AUTOMATED COUNT: 12.6 % (ref 11–15)
GLOBULIN PLAS-MCNC: 3.6 G/DL (ref 2.8–4.4)
GLUCOSE BLD-MCNC: 95 MG/DL (ref 70–99)
HCT VFR BLD AUTO: 43.6 % (ref 35–48)
HGB BLD-MCNC: 14.2 G/DL (ref 12–16)
IMM GRANULOCYTES # BLD AUTO: 0.02 X10(3) UL (ref 0–1)
IMM GRANULOCYTES NFR BLD: 0.3 %
LYMPHOCYTES # BLD AUTO: 1.77 X10(3) UL (ref 1–4)
LYMPHOCYTES NFR BLD AUTO: 24.5 %
M PROTEIN MFR SERPL ELPH: 7 G/DL (ref 6.4–8.2)
MCH RBC QN AUTO: 30.9 PG (ref 26–34)
MCHC RBC AUTO-ENTMCNC: 32.6 G/DL (ref 31–37)
MCV RBC AUTO: 94.8 FL (ref 80–100)
MONOCYTES # BLD AUTO: 0.59 X10(3) UL (ref 0.1–1)
MONOCYTES NFR BLD AUTO: 8.2 %
NEUTROPHILS # BLD AUTO: 4.61 X10 (3) UL (ref 1.5–7.7)
NEUTROPHILS # BLD AUTO: 4.61 X10(3) UL (ref 1.5–7.7)
NEUTROPHILS NFR BLD AUTO: 63.7 %
OSMOLALITY SERPL CALC.SUM OF ELEC: 290 MOSM/KG (ref 275–295)
PATIENT FASTING Y/N/NP: YES
PLATELET # BLD AUTO: 181 10(3)UL (ref 150–450)
POTASSIUM SERPL-SCNC: 4.2 MMOL/L (ref 3.5–5.1)
RBC # BLD AUTO: 4.6 X10(6)UL (ref 3.8–5.3)
SODIUM SERPL-SCNC: 140 MMOL/L (ref 136–145)
WBC # BLD AUTO: 7.2 X10(3) UL (ref 4–11)

## 2020-09-08 PROCEDURE — 85025 COMPLETE CBC W/AUTO DIFF WBC: CPT

## 2020-09-08 PROCEDURE — 36415 COLL VENOUS BLD VENIPUNCTURE: CPT

## 2020-09-08 PROCEDURE — 80053 COMPREHEN METABOLIC PANEL: CPT

## 2020-09-09 ENCOUNTER — SNF DISCHARGE (OUTPATIENT)
Dept: INTERNAL MEDICINE CLINIC | Age: 80
End: 2020-09-09

## 2020-09-09 VITALS — HEART RATE: 65 BPM | OXYGEN SATURATION: 96 %

## 2020-09-09 DIAGNOSIS — I10 BENIGN ESSENTIAL HTN: ICD-10-CM

## 2020-09-09 DIAGNOSIS — F32.0 CURRENT MILD EPISODE OF MAJOR DEPRESSIVE DISORDER, UNSPECIFIED WHETHER RECURRENT (HCC): ICD-10-CM

## 2020-09-09 DIAGNOSIS — Z78.9 IMPAIRED MOBILITY AND ADLS: ICD-10-CM

## 2020-09-09 DIAGNOSIS — M48.061 SPINAL STENOSIS OF LUMBAR REGION, UNSPECIFIED WHETHER NEUROGENIC CLAUDICATION PRESENT: ICD-10-CM

## 2020-09-09 DIAGNOSIS — F41.9 ANXIETY: ICD-10-CM

## 2020-09-09 DIAGNOSIS — G93.40 ACUTE ENCEPHALOPATHY: Primary | ICD-10-CM

## 2020-09-09 DIAGNOSIS — R53.1 WEAKNESS GENERALIZED: ICD-10-CM

## 2020-09-09 DIAGNOSIS — R45.1 AGITATION: ICD-10-CM

## 2020-09-09 DIAGNOSIS — E03.9 HYPOTHYROIDISM, UNSPECIFIED TYPE: ICD-10-CM

## 2020-09-09 DIAGNOSIS — M15.9 OSTEOARTHRITIS OF MULTIPLE JOINTS, UNSPECIFIED OSTEOARTHRITIS TYPE: ICD-10-CM

## 2020-09-09 DIAGNOSIS — F03.90 MAJOR NEUROCOGNITIVE DISORDER (HCC): ICD-10-CM

## 2020-09-09 DIAGNOSIS — Z74.09 IMPAIRED MOBILITY AND ADLS: ICD-10-CM

## 2020-09-09 PROCEDURE — 99316 NF DSCHRG MGMT 30 MIN+: CPT | Performed by: NURSE PRACTITIONER

## 2020-09-10 NOTE — PROGRESS NOTES
Lakesha Martin, 10/17/1940, 70year old, female is being discharged from Facility: [address] [place] [address] [address]    Date of Admission:  8.12.2020    Date of Discharge:  9.9.2020                                 Admitting Diagnoses:  1.  Acute drainage from eyes  HENT: normocephalic; normal nose, no nasal drainage, mucous membranes pink, moist, pharynx no exudate, no visible cerumen.   NECK: supple; FROM; no JVD, no TMG, no carotid bruits  BREAST: --- deferred  RESPIRATORY:clear to auscultation a OSMOCALC 290 09/08/2020    ALKPHO 96 09/08/2020    AST 17 09/08/2020    ALT 19 09/08/2020    ALKPHOS 96 08/08/2016    BILT 0.5 09/08/2020    TP 7.0 09/08/2020    ALB 3.4 09/08/2020    GLOBULIN 3.6 09/08/2020    AGRATIO 1.3 08/08/2016     09/08/202

## 2020-09-14 ENCOUNTER — SNF DISCHARGE (OUTPATIENT)
Dept: INTERNAL MEDICINE CLINIC | Age: 80
End: 2020-09-14

## 2020-09-14 VITALS
HEART RATE: 65 BPM | SYSTOLIC BLOOD PRESSURE: 103 MMHG | TEMPERATURE: 98 F | OXYGEN SATURATION: 97 % | RESPIRATION RATE: 18 BRPM | DIASTOLIC BLOOD PRESSURE: 74 MMHG

## 2020-09-14 DIAGNOSIS — M15.9 OSTEOARTHRITIS OF MULTIPLE JOINTS, UNSPECIFIED OSTEOARTHRITIS TYPE: ICD-10-CM

## 2020-09-14 DIAGNOSIS — F41.9 ANXIETY: ICD-10-CM

## 2020-09-14 DIAGNOSIS — F03.90 MAJOR NEUROCOGNITIVE DISORDER (HCC): ICD-10-CM

## 2020-09-14 DIAGNOSIS — Z74.09 IMPAIRED MOBILITY AND ADLS: ICD-10-CM

## 2020-09-14 DIAGNOSIS — G93.40 ACUTE ENCEPHALOPATHY: Primary | ICD-10-CM

## 2020-09-14 DIAGNOSIS — I10 BENIGN ESSENTIAL HTN: ICD-10-CM

## 2020-09-14 DIAGNOSIS — E03.9 HYPOTHYROIDISM, UNSPECIFIED TYPE: ICD-10-CM

## 2020-09-14 DIAGNOSIS — Z78.9 IMPAIRED MOBILITY AND ADLS: ICD-10-CM

## 2020-09-14 DIAGNOSIS — R53.1 WEAKNESS GENERALIZED: ICD-10-CM

## 2020-09-14 DIAGNOSIS — R45.1 AGITATION: ICD-10-CM

## 2020-09-14 DIAGNOSIS — M48.061 SPINAL STENOSIS OF LUMBAR REGION, UNSPECIFIED WHETHER NEUROGENIC CLAUDICATION PRESENT: ICD-10-CM

## 2020-09-14 DIAGNOSIS — R41.3 MEMORY IMPAIRMENT: ICD-10-CM

## 2020-09-14 DIAGNOSIS — F32.0 CURRENT MILD EPISODE OF MAJOR DEPRESSIVE DISORDER, UNSPECIFIED WHETHER RECURRENT (HCC): ICD-10-CM

## 2020-09-14 PROCEDURE — 99316 NF DSCHRG MGMT 30 MIN+: CPT | Performed by: NURSE PRACTITIONER

## 2020-09-14 NOTE — PROGRESS NOTES
Rufino Man, 10/17/1940, 70year old, female is being discharged from Facility: [address] [place] [address] [address]    Date of Admission:  8.12.2020    Date of Discharge:  Anticipated on 9.17.2020                               [address] pale, warm, dry  WOUND:  None  EYES: PERRLA, EOMI, sclera anicteric, conjunctiva normal; there is no nystagmus, no drainage from eyes  HENT: normocephalic; normal nose, no nasal drainage, mucous membranes pink, moist, pharynx no exudate, no visible cerumen ANIONGAP 6 09/08/2020    GFR 77 07/29/2016    GFRNAA 85 09/08/2020    GFRAA 99 09/08/2020    CA 10.0 09/08/2020    OSMOCALC 290 09/08/2020    ALKPHO 96 09/08/2020    AST 17 09/08/2020    ALT 19 09/08/2020    ALKPHOS 96 08/08/2016    BILT 0.5 09/08/2020

## 2020-09-16 ENCOUNTER — NURSE ONLY (OUTPATIENT)
Dept: LAB | Age: 80
End: 2020-09-16
Attending: FAMILY MEDICINE
Payer: MEDICARE

## 2020-09-16 DIAGNOSIS — I10 ESSENTIAL HYPERTENSION: Primary | ICD-10-CM

## 2020-09-16 LAB
ALBUMIN SERPL-MCNC: 3.3 G/DL (ref 3.4–5)
ALBUMIN/GLOB SERPL: 0.9 {RATIO} (ref 1–2)
ALP LIVER SERPL-CCNC: 92 U/L (ref 55–142)
ALT SERPL-CCNC: 18 U/L (ref 13–56)
ANION GAP SERPL CALC-SCNC: 5 MMOL/L (ref 0–18)
AST SERPL-CCNC: 20 U/L (ref 15–37)
BASOPHILS # BLD AUTO: 0.07 X10(3) UL (ref 0–0.2)
BASOPHILS NFR BLD AUTO: 0.9 %
BILIRUB SERPL-MCNC: 0.4 MG/DL (ref 0.1–2)
BUN BLD-MCNC: 20 MG/DL (ref 7–18)
BUN/CREAT SERPL: 30.3 (ref 10–20)
CALCIUM BLD-MCNC: 9.3 MG/DL (ref 8.5–10.1)
CHLORIDE SERPL-SCNC: 108 MMOL/L (ref 98–112)
CO2 SERPL-SCNC: 29 MMOL/L (ref 21–32)
CREAT BLD-MCNC: 0.66 MG/DL (ref 0.55–1.02)
DEPRECATED RDW RBC AUTO: 44.2 FL (ref 35.1–46.3)
EOSINOPHIL # BLD AUTO: 0.24 X10(3) UL (ref 0–0.7)
EOSINOPHIL NFR BLD AUTO: 3.1 %
ERYTHROCYTE [DISTWIDTH] IN BLOOD BY AUTOMATED COUNT: 12.8 % (ref 11–15)
GLOBULIN PLAS-MCNC: 3.5 G/DL (ref 2.8–4.4)
GLUCOSE BLD-MCNC: 69 MG/DL (ref 70–99)
HCT VFR BLD AUTO: 42.3 % (ref 35–48)
HGB BLD-MCNC: 13.6 G/DL (ref 12–16)
IMM GRANULOCYTES # BLD AUTO: 0.01 X10(3) UL (ref 0–1)
IMM GRANULOCYTES NFR BLD: 0.1 %
LYMPHOCYTES # BLD AUTO: 2.12 X10(3) UL (ref 1–4)
LYMPHOCYTES NFR BLD AUTO: 27.2 %
M PROTEIN MFR SERPL ELPH: 6.8 G/DL (ref 6.4–8.2)
MCH RBC QN AUTO: 30.3 PG (ref 26–34)
MCHC RBC AUTO-ENTMCNC: 32.2 G/DL (ref 31–37)
MCV RBC AUTO: 94.2 FL (ref 80–100)
MONOCYTES # BLD AUTO: 0.78 X10(3) UL (ref 0.1–1)
MONOCYTES NFR BLD AUTO: 10 %
NEUTROPHILS # BLD AUTO: 4.57 X10 (3) UL (ref 1.5–7.7)
NEUTROPHILS # BLD AUTO: 4.57 X10(3) UL (ref 1.5–7.7)
NEUTROPHILS NFR BLD AUTO: 58.7 %
OSMOLALITY SERPL CALC.SUM OF ELEC: 295 MOSM/KG (ref 275–295)
PATIENT FASTING Y/N/NP: YES
PLATELET # BLD AUTO: 204 10(3)UL (ref 150–450)
POTASSIUM SERPL-SCNC: 4.1 MMOL/L (ref 3.5–5.1)
RBC # BLD AUTO: 4.49 X10(6)UL (ref 3.8–5.3)
SODIUM SERPL-SCNC: 142 MMOL/L (ref 136–145)
WBC # BLD AUTO: 7.8 X10(3) UL (ref 4–11)

## 2020-09-16 PROCEDURE — 80053 COMPREHEN METABOLIC PANEL: CPT

## 2020-09-16 PROCEDURE — 85025 COMPLETE CBC W/AUTO DIFF WBC: CPT

## 2020-09-16 PROCEDURE — 36415 COLL VENOUS BLD VENIPUNCTURE: CPT

## 2020-09-21 ENCOUNTER — NURSE ONLY (OUTPATIENT)
Dept: LAB | Age: 80
End: 2020-09-21
Attending: FAMILY MEDICINE
Payer: MEDICARE

## 2020-09-21 DIAGNOSIS — R69 DIAGNOSIS UNKNOWN: Primary | ICD-10-CM

## 2020-09-21 LAB
ALBUMIN SERPL-MCNC: 3.3 G/DL (ref 3.4–5)
ALBUMIN/GLOB SERPL: 0.8 {RATIO} (ref 1–2)
ALP LIVER SERPL-CCNC: 95 U/L
ALT SERPL-CCNC: 28 U/L
ANION GAP SERPL CALC-SCNC: 3 MMOL/L (ref 0–18)
AST SERPL-CCNC: 37 U/L (ref 15–37)
BASOPHILS # BLD AUTO: 0.06 X10(3) UL (ref 0–0.2)
BASOPHILS NFR BLD AUTO: 0.9 %
BILIRUB SERPL-MCNC: 0.4 MG/DL (ref 0.1–2)
BUN BLD-MCNC: 16 MG/DL (ref 7–18)
BUN/CREAT SERPL: 21.6 (ref 10–20)
CALCIUM BLD-MCNC: 9.8 MG/DL (ref 8.5–10.1)
CHLORIDE SERPL-SCNC: 108 MMOL/L (ref 98–112)
CO2 SERPL-SCNC: 28 MMOL/L (ref 21–32)
CREAT BLD-MCNC: 0.74 MG/DL
DEPRECATED RDW RBC AUTO: 42.3 FL (ref 35.1–46.3)
EOSINOPHIL # BLD AUTO: 0.21 X10(3) UL (ref 0–0.7)
EOSINOPHIL NFR BLD AUTO: 3.2 %
ERYTHROCYTE [DISTWIDTH] IN BLOOD BY AUTOMATED COUNT: 12.6 % (ref 11–15)
GLOBULIN PLAS-MCNC: 3.9 G/DL (ref 2.8–4.4)
GLUCOSE BLD-MCNC: 92 MG/DL (ref 70–99)
HCT VFR BLD AUTO: 44.4 %
HGB BLD-MCNC: 14.7 G/DL
IMM GRANULOCYTES # BLD AUTO: 0.01 X10(3) UL (ref 0–1)
IMM GRANULOCYTES NFR BLD: 0.2 %
LYMPHOCYTES # BLD AUTO: 1.8 X10(3) UL (ref 1–4)
LYMPHOCYTES NFR BLD AUTO: 27.6 %
M PROTEIN MFR SERPL ELPH: 7.2 G/DL (ref 6.4–8.2)
MCH RBC QN AUTO: 30.4 PG (ref 26–34)
MCHC RBC AUTO-ENTMCNC: 33.1 G/DL (ref 31–37)
MCV RBC AUTO: 91.7 FL
MONOCYTES # BLD AUTO: 0.57 X10(3) UL (ref 0.1–1)
MONOCYTES NFR BLD AUTO: 8.7 %
NEUTROPHILS # BLD AUTO: 3.87 X10 (3) UL (ref 1.5–7.7)
NEUTROPHILS # BLD AUTO: 3.87 X10(3) UL (ref 1.5–7.7)
NEUTROPHILS NFR BLD AUTO: 59.4 %
OSMOLALITY SERPL CALC.SUM OF ELEC: 289 MOSM/KG (ref 275–295)
PATIENT FASTING Y/N/NP: YES
PLATELET # BLD AUTO: 212 10(3)UL (ref 150–450)
POTASSIUM SERPL-SCNC: 5.7 MMOL/L (ref 3.5–5.1)
RBC # BLD AUTO: 4.84 X10(6)UL
SODIUM SERPL-SCNC: 139 MMOL/L (ref 136–145)
WBC # BLD AUTO: 6.5 X10(3) UL (ref 4–11)

## 2020-09-21 PROCEDURE — 85025 COMPLETE CBC W/AUTO DIFF WBC: CPT

## 2020-09-21 PROCEDURE — 36415 COLL VENOUS BLD VENIPUNCTURE: CPT

## 2020-09-21 PROCEDURE — 80053 COMPREHEN METABOLIC PANEL: CPT

## 2020-09-23 ENCOUNTER — NURSE ONLY (OUTPATIENT)
Dept: LAB | Age: 80
End: 2020-09-23
Attending: FAMILY MEDICINE
Payer: MEDICARE

## 2020-09-23 DIAGNOSIS — R69 DIAGNOSIS UNKNOWN: Primary | ICD-10-CM

## 2020-09-23 LAB
ALBUMIN SERPL-MCNC: 3.2 G/DL (ref 3.4–5)
ALBUMIN/GLOB SERPL: 0.7 {RATIO} (ref 1–2)
ALP LIVER SERPL-CCNC: 101 U/L
ALT SERPL-CCNC: 21 U/L
ANION GAP SERPL CALC-SCNC: 7 MMOL/L (ref 0–18)
AST SERPL-CCNC: 16 U/L (ref 15–37)
BASOPHILS # BLD AUTO: 0.08 X10(3) UL (ref 0–0.2)
BASOPHILS NFR BLD AUTO: 1.1 %
BILIRUB SERPL-MCNC: 0.3 MG/DL (ref 0.1–2)
BUN BLD-MCNC: 15 MG/DL (ref 7–18)
BUN/CREAT SERPL: 18.5 (ref 10–20)
CALCIUM BLD-MCNC: 9.3 MG/DL (ref 8.5–10.1)
CHLORIDE SERPL-SCNC: 108 MMOL/L (ref 98–112)
CO2 SERPL-SCNC: 27 MMOL/L (ref 21–32)
CREAT BLD-MCNC: 0.81 MG/DL
DEPRECATED RDW RBC AUTO: 43.6 FL (ref 35.1–46.3)
EOSINOPHIL # BLD AUTO: 0.29 X10(3) UL (ref 0–0.7)
EOSINOPHIL NFR BLD AUTO: 3.9 %
ERYTHROCYTE [DISTWIDTH] IN BLOOD BY AUTOMATED COUNT: 12.6 % (ref 11–15)
GLOBULIN PLAS-MCNC: 4.3 G/DL (ref 2.8–4.4)
GLUCOSE BLD-MCNC: 78 MG/DL (ref 70–99)
HCT VFR BLD AUTO: 42.9 %
HGB BLD-MCNC: 14.3 G/DL
IMM GRANULOCYTES # BLD AUTO: 0.02 X10(3) UL (ref 0–1)
IMM GRANULOCYTES NFR BLD: 0.3 %
LYMPHOCYTES # BLD AUTO: 2.14 X10(3) UL (ref 1–4)
LYMPHOCYTES NFR BLD AUTO: 28.5 %
M PROTEIN MFR SERPL ELPH: 7.5 G/DL (ref 6.4–8.2)
MCH RBC QN AUTO: 31.3 PG (ref 26–34)
MCHC RBC AUTO-ENTMCNC: 33.3 G/DL (ref 31–37)
MCV RBC AUTO: 93.9 FL
MONOCYTES # BLD AUTO: 0.77 X10(3) UL (ref 0.1–1)
MONOCYTES NFR BLD AUTO: 10.3 %
NEUTROPHILS # BLD AUTO: 4.2 X10 (3) UL (ref 1.5–7.7)
NEUTROPHILS # BLD AUTO: 4.2 X10(3) UL (ref 1.5–7.7)
NEUTROPHILS NFR BLD AUTO: 55.9 %
OSMOLALITY SERPL CALC.SUM OF ELEC: 294 MOSM/KG (ref 275–295)
PATIENT FASTING Y/N/NP: YES
PLATELET # BLD AUTO: 190 10(3)UL (ref 150–450)
POTASSIUM SERPL-SCNC: 4.3 MMOL/L (ref 3.5–5.1)
RBC # BLD AUTO: 4.57 X10(6)UL
SODIUM SERPL-SCNC: 142 MMOL/L (ref 136–145)
WBC # BLD AUTO: 7.5 X10(3) UL (ref 4–11)

## 2020-09-23 PROCEDURE — 36415 COLL VENOUS BLD VENIPUNCTURE: CPT

## 2020-09-23 PROCEDURE — 85025 COMPLETE CBC W/AUTO DIFF WBC: CPT

## 2020-09-23 PROCEDURE — 80053 COMPREHEN METABOLIC PANEL: CPT

## 2020-09-30 ENCOUNTER — NURSE ONLY (OUTPATIENT)
Dept: LAB | Age: 80
End: 2020-09-30
Attending: FAMILY MEDICINE
Payer: MEDICARE

## 2020-09-30 DIAGNOSIS — G93.49 OTHER ENCEPHALOPATHY: Primary | ICD-10-CM

## 2020-09-30 PROCEDURE — 36415 COLL VENOUS BLD VENIPUNCTURE: CPT

## 2020-09-30 PROCEDURE — 80053 COMPREHEN METABOLIC PANEL: CPT

## 2020-09-30 PROCEDURE — 85025 COMPLETE CBC W/AUTO DIFF WBC: CPT

## 2020-10-21 ENCOUNTER — NURSE ONLY (OUTPATIENT)
Dept: LAB | Age: 80
End: 2020-10-21
Attending: FAMILY MEDICINE
Payer: MEDICARE

## 2020-10-21 DIAGNOSIS — R69 DIAGNOSIS UNKNOWN: Primary | ICD-10-CM

## 2020-10-21 PROCEDURE — 36415 COLL VENOUS BLD VENIPUNCTURE: CPT

## 2020-10-21 PROCEDURE — 85025 COMPLETE CBC W/AUTO DIFF WBC: CPT

## 2020-10-21 PROCEDURE — 80053 COMPREHEN METABOLIC PANEL: CPT

## 2020-10-28 ENCOUNTER — NURSE ONLY (OUTPATIENT)
Dept: LAB | Age: 80
End: 2020-10-28
Attending: FAMILY MEDICINE
Payer: MEDICARE

## 2020-10-28 DIAGNOSIS — G93.49 OTHER ENCEPHALOPATHY: Primary | ICD-10-CM

## 2020-11-18 ENCOUNTER — NURSE ONLY (OUTPATIENT)
Dept: LAB | Age: 80
End: 2020-11-18
Attending: FAMILY MEDICINE
Payer: MEDICARE

## 2020-11-18 DIAGNOSIS — R69 DIAGNOSIS UNKNOWN: Primary | ICD-10-CM

## 2020-11-18 PROCEDURE — 80053 COMPREHEN METABOLIC PANEL: CPT

## 2020-11-18 PROCEDURE — 85025 COMPLETE CBC W/AUTO DIFF WBC: CPT

## 2020-11-18 PROCEDURE — 36415 COLL VENOUS BLD VENIPUNCTURE: CPT

## 2020-11-28 PROBLEM — M47.816 ARTHRITIS, LUMBAR SPINE: Status: ACTIVE | Noted: 2020-11-28

## 2020-11-28 PROBLEM — N30.00 ACUTE CYSTITIS WITHOUT HEMATURIA: Status: RESOLVED | Noted: 2020-08-08 | Resolved: 2020-11-28

## 2020-11-28 PROBLEM — F33.9 EPISODE OF RECURRENT MAJOR DEPRESSIVE DISORDER (HCC): Status: ACTIVE | Noted: 2020-11-28

## 2020-11-28 NOTE — PROGRESS NOTES
HPI:    Alycia Hemphill is a 68year old female who presents for No chief complaint on file. 67 yo female presenting to [facility] and now converted to LTC with history of Dementia, major cognitive decline and requires LTC.    Patient's alert and oriented wi 12/14/2015); patient withough preoperative order for iv antibiotic surgical site infection prophylaxis. (Bilateral, 12/14/2015); patient documented not to have experienced any of the following events (Bilateral, 12/14/2015); and total knee replacement.    H no drainage or sinus tenderness   Throat: Lips, mucosa, and tongue normal; teeth and gums normal   Neck: Supple, symmetrical, trachea midline, no adenopathy, thyroid: not enlarged, symmetric, no tenderness/mass/nodules, no carotid bruit or JVD   Back:   Sy MD,

## 2020-11-30 ENCOUNTER — TELEPHONE (OUTPATIENT)
Dept: ORTHOPEDICS | Age: 80
End: 2020-11-30

## 2021-02-03 DIAGNOSIS — Z23 NEED FOR VACCINATION: ICD-10-CM

## 2021-05-25 PROBLEM — R41.3 MEMORY IMPAIRMENT: Status: RESOLVED | Noted: 2019-02-28 | Resolved: 2021-05-25

## 2021-05-25 PROBLEM — F41.9 ANXIETY: Status: RESOLVED | Noted: 2018-02-26 | Resolved: 2021-05-25

## 2021-05-25 PROBLEM — F02.818 ALZHEIMER'S DEMENTIA WITH BEHAVIORAL DISTURBANCE (HCC): Status: ACTIVE | Noted: 2021-05-25

## 2021-05-25 PROBLEM — F06.30 MOOD DISORDER IN CONDITIONS CLASSIFIED ELSEWHERE: Status: RESOLVED | Noted: 2019-11-04 | Resolved: 2021-05-25

## 2021-05-25 PROBLEM — F02.81 ALZHEIMER'S DEMENTIA WITH BEHAVIORAL DISTURBANCE (HCC): Status: ACTIVE | Noted: 2021-05-25

## 2021-05-25 PROBLEM — G30.9 ALZHEIMER'S DEMENTIA WITH BEHAVIORAL DISTURBANCE (HCC): Status: ACTIVE | Noted: 2021-05-25

## 2021-05-25 PROBLEM — F33.1 MAJOR DEPRESSIVE DISORDER, RECURRENT EPISODE, MODERATE (HCC): Chronic | Status: ACTIVE | Noted: 2021-05-25

## 2021-05-25 PROBLEM — R45.1 AGITATION: Status: RESOLVED | Noted: 2020-08-08 | Resolved: 2021-05-25

## 2021-09-08 ENCOUNTER — APPOINTMENT (OUTPATIENT)
Dept: GENERAL RADIOLOGY | Facility: HOSPITAL | Age: 81
End: 2021-09-08
Attending: EMERGENCY MEDICINE
Payer: MEDICARE

## 2021-09-08 ENCOUNTER — APPOINTMENT (OUTPATIENT)
Dept: CT IMAGING | Facility: HOSPITAL | Age: 81
End: 2021-09-08
Attending: EMERGENCY MEDICINE
Payer: MEDICARE

## 2021-09-08 ENCOUNTER — HOSPITAL ENCOUNTER (EMERGENCY)
Facility: HOSPITAL | Age: 81
Discharge: HOME | End: 2021-09-08
Attending: EMERGENCY MEDICINE
Payer: MEDICARE

## 2021-09-08 VITALS
OXYGEN SATURATION: 98 % | TEMPERATURE: 98 F | HEART RATE: 58 BPM | RESPIRATION RATE: 17 BRPM | SYSTOLIC BLOOD PRESSURE: 138 MMHG | DIASTOLIC BLOOD PRESSURE: 55 MMHG

## 2021-09-08 DIAGNOSIS — S80.02XA CONTUSION OF LEFT KNEE, INITIAL ENCOUNTER: ICD-10-CM

## 2021-09-08 DIAGNOSIS — S16.1XXA STRAIN OF NECK MUSCLE, INITIAL ENCOUNTER: ICD-10-CM

## 2021-09-08 DIAGNOSIS — S09.8XXA BLUNT HEAD TRAUMA, INITIAL ENCOUNTER: Primary | ICD-10-CM

## 2021-09-08 LAB — GLUCOSE BLD-MCNC: 109 MG/DL (ref 70–99)

## 2021-09-08 PROCEDURE — 12001 RPR S/N/AX/GEN/TRNK 2.5CM/<: CPT

## 2021-09-08 PROCEDURE — 72125 CT NECK SPINE W/O DYE: CPT | Performed by: EMERGENCY MEDICINE

## 2021-09-08 PROCEDURE — 99284 EMERGENCY DEPT VISIT MOD MDM: CPT

## 2021-09-08 PROCEDURE — 70450 CT HEAD/BRAIN W/O DYE: CPT | Performed by: EMERGENCY MEDICINE

## 2021-09-08 PROCEDURE — 82962 GLUCOSE BLOOD TEST: CPT

## 2021-09-08 PROCEDURE — 73560 X-RAY EXAM OF KNEE 1 OR 2: CPT | Performed by: EMERGENCY MEDICINE

## 2021-09-08 RX ORDER — LIDOCAINE HYDROCHLORIDE AND EPINEPHRINE 5; 5 MG/ML; UG/ML
1 INJECTION, SOLUTION INFILTRATION; PERINEURAL ONCE
Status: COMPLETED | OUTPATIENT
Start: 2021-09-08 | End: 2021-09-08

## 2021-09-08 NOTE — ED PROVIDER NOTES
Patient Seen in: [facility] Emergency Department      History   Patient presents with:  Fall  Laceration/Abrasion    Stated Complaint: fall    HPI/Subjective:   HPI    Patient is 24-year-old female who has history of dementia from a local nursing ho LUMBAR/SACRAL, SINGLE LEVEL Bilateral 12/14/2015    Procedure: TRANSFORAMINAL EPIDURAL STEROID INJECTION;  Surgeon: Bentley Aguilar MD;  Location: [facility]   • INJECTION, ANESTHETIC/STEROID, TRANSFORAMINAL EPIDURAL; LUMBAR/SACRAL, SINGLE LEVEL Bilat 98 %   O2 Device None (Room air)       Current:/55   Pulse 58   Temp 97.5 °F (36.4 °C) (Temporal)   Resp 17   LMP 08/28/1970 (LMP Unknown)   SpO2 98%         Physical Exam  GENERAL: Well-developed, well-nourished female sitting up breathing easily in (KUT=71751)    Result Date: 9/8/2021  CONCLUSION:  No acute osseous abnormality is evident.    Dictated by (CST): Gretchen Odonnell MD on 9/08/2021 at 12:04 PM     Finalized by (CST): Gretchen Odonnell MD on 9/08/2021 at 12:05 PM       CT BRAIN OR HEAD (674 encounter diagnosis)  Strain of neck muscle, initial encounter  Contusion of left knee, initial encounter     Disposition:  Discharge  9/8/2021 12:33 pm    Follow-up:  DO Adelia Joaquin  [phone]    Call in 2 days

## 2021-09-08 NOTE — ED INITIAL ASSESSMENT (HPI)
PT ARRIVED TO ED VIA EMS FROM [address]. PT WAS FOUND CRAWLING OUT INTO THE HALLWAY AT APPROX 8:30 THIS MORNING. FALL WAS UNWITNESSED; BUT IT WAS ASSUMED PT WAS TRYING TO GET OUT OF BED. PER EMS, PT HAS HX OF FALLS. SHE IS NOT ON BLOOD THINNERS.

## 2021-09-09 PROBLEM — F33.0 MAJOR DEPRESSIVE DISORDER, RECURRENT EPISODE, MILD (HCC): Status: ACTIVE | Noted: 2021-09-09

## 2021-11-30 ENCOUNTER — APPOINTMENT (OUTPATIENT)
Dept: GENERAL RADIOLOGY | Facility: HOSPITAL | Age: 81
DRG: 392 | End: 2021-11-30
Attending: EMERGENCY MEDICINE
Payer: MEDICARE

## 2021-11-30 ENCOUNTER — APPOINTMENT (OUTPATIENT)
Dept: CT IMAGING | Facility: HOSPITAL | Age: 81
DRG: 392 | End: 2021-11-30
Attending: EMERGENCY MEDICINE
Payer: MEDICARE

## 2021-11-30 ENCOUNTER — HOSPITAL ENCOUNTER (INPATIENT)
Facility: HOSPITAL | Age: 81
LOS: 2 days | Discharge: HOME | DRG: 392 | End: 2021-12-03
Attending: EMERGENCY MEDICINE | Admitting: HOSPITALIST
Payer: MEDICARE

## 2021-11-30 DIAGNOSIS — F02.80 ALZHEIMER'S DEMENTIA WITHOUT BEHAVIORAL DISTURBANCE, UNSPECIFIED TIMING OF DEMENTIA ONSET (HCC): ICD-10-CM

## 2021-11-30 DIAGNOSIS — I95.9 HYPOTENSION, UNSPECIFIED HYPOTENSION TYPE: ICD-10-CM

## 2021-11-30 DIAGNOSIS — G30.9 ALZHEIMER'S DEMENTIA WITHOUT BEHAVIORAL DISTURBANCE, UNSPECIFIED TIMING OF DEMENTIA ONSET (HCC): ICD-10-CM

## 2021-11-30 DIAGNOSIS — A41.9 SEPSIS, DUE TO UNSPECIFIED ORGANISM, UNSPECIFIED WHETHER ACUTE ORGAN DYSFUNCTION PRESENT (HCC): ICD-10-CM

## 2021-11-30 DIAGNOSIS — R79.89 ELEVATED LACTIC ACID LEVEL: Primary | ICD-10-CM

## 2021-11-30 DIAGNOSIS — R19.7 DIARRHEA, UNSPECIFIED TYPE: ICD-10-CM

## 2021-11-30 DIAGNOSIS — K52.9 COLITIS: ICD-10-CM

## 2021-11-30 PROCEDURE — 74177 CT ABD & PELVIS W/CONTRAST: CPT | Performed by: EMERGENCY MEDICINE

## 2021-11-30 PROCEDURE — 71045 X-RAY EXAM CHEST 1 VIEW: CPT | Performed by: EMERGENCY MEDICINE

## 2021-11-30 PROCEDURE — 99223 1ST HOSP IP/OBS HIGH 75: CPT | Performed by: INTERNAL MEDICINE

## 2021-11-30 RX ORDER — ASPIRIN 81 MG/1
324 TABLET, CHEWABLE ORAL ONCE
Status: DISCONTINUED | OUTPATIENT
Start: 2021-11-30 | End: 2021-12-02 | Stop reason: ALTCHOICE

## 2021-11-30 RX ORDER — SODIUM CHLORIDE 9 MG/ML
INJECTION, SOLUTION INTRAVENOUS CONTINUOUS
Status: DISCONTINUED | OUTPATIENT
Start: 2021-11-30 | End: 2021-12-03

## 2021-11-30 RX ORDER — ACETAMINOPHEN 325 MG/1
650 TABLET ORAL EVERY 6 HOURS PRN
Status: DISCONTINUED | OUTPATIENT
Start: 2021-11-30 | End: 2021-12-03

## 2021-11-30 RX ORDER — DONEPEZIL HYDROCHLORIDE 5 MG/1
5 TABLET, FILM COATED ORAL NIGHTLY
Status: DISCONTINUED | OUTPATIENT
Start: 2021-11-30 | End: 2021-12-03

## 2021-11-30 RX ORDER — MELATONIN
3 NIGHTLY PRN
Status: DISCONTINUED | OUTPATIENT
Start: 2021-11-30 | End: 2021-12-03

## 2021-11-30 RX ORDER — LISINOPRIL 10 MG/1
10 TABLET ORAL DAILY
Refills: 2 | Status: DISCONTINUED | OUTPATIENT
Start: 2021-12-01 | End: 2021-12-03

## 2021-11-30 RX ORDER — LEVOTHYROXINE SODIUM 0.05 MG/1
100 TABLET ORAL
Status: DISCONTINUED | OUTPATIENT
Start: 2021-12-01 | End: 2021-12-03

## 2021-11-30 NOTE — ED INITIAL ASSESSMENT (HPI)
PT TO ED FROM NURSING HOME/ASSISTED LIVING WITH C/O SUB-STERNAL CP, GENERALIZED BACK PAIN  AND GENERALIZED ABDOMINAL PAIN X 1 DAY. + NAUSEA. PT TELLING RN HER MOTHER IS IN THE ROOM, HER MOTHER IS NOT PRESENT.

## 2021-11-30 NOTE — ED PROVIDER NOTES
Patient Seen in: [facility] Emergency Department      History   Patient presents with:  Chest Pain    Stated Complaint: chest pain / abdominal pain     Subjective:   HPI    68-year-old female presents emergency department from nursing home assisted EPIDURAL; LUMBAR/SACRAL, SINGLE LEVEL Bilateral 2015    Procedure: TRANSFORAMINAL EPIDURAL STEROID INJECTION;  Surgeon: Rica Olivier MD;  Location: [facility]   •      • PATIENT DOCUMENTED NOT TO HAVE EXPERIENCED ANY OF THE FOLLOWING EVEN 97.9 °F (36.6 °C) (Oral)   Resp (!) 31   Ht 165.1 cm (5' 5\")   Wt 63.5 kg   LMP 08/28/1970 (LMP Unknown)   SpO2 100%   BMI 23.30 kg/m²         Physical Exam    All measures to prevent infection transmission during my interaction with the patient were take POST POSITIVE - Abnormal; Notable for the following components:    Lactic Acid 2.4 (*)     All other components within normal limits   OCCULT BLOOD, STOOL - Abnormal; Notable for the following components:    Occult Blood Result Positive for Occult Blood Willy Ferrell chemistry rapid Covid troponin lactic acid and a lipase drawn. Also will get a chest x-ray. Patient has no reproducible abdominal tenderness.        XR CHEST AP PORTABLE  (CPT=71045)    Result Date: 11/30/2021  PROCEDURE:  XR CHEST AP PORTABLE  (CPT=71045 the field of view. LUNG BASE:  Scattered scarring/atelectasis. LIVER:  Diffuse fatty infiltration of the liver. Subcentimeter hypodensities in the liver measuring up to 6 mm are too small to further characterize and warrant no specific follow-up.   Hepatic Age-indeterminate mild concave compression deformities of the superior endplates of L1 and L2 noted. Clinical correlation recommended. 4. Diffuse fatty infiltration of the liver. Please see above for further details.    Dictated by (CST): Laray Cogan, result errors may occur. When identified these errors have been corrected.  While every attempt is made to correct errors during dictation discrepancies may still exist                             Disposition and Plan     Clinical Impression:  Elevated lact

## 2021-11-30 NOTE — ED QUICK NOTES
RN TALKED WITH DAUGHTER RUDDY, JONAS ROMERO PT SEEING HER MOTHER IS PT BASELINE, RECENT DEATH OF HER DAUGHTER AND PT HAS BEEN TALKING TO HER MOTHER SINCE DEATH. PT IS A RESIDENT OF THE [address]      PLEASE Yony Cain; [phone] ID ANY FURTHER

## 2021-12-01 PROCEDURE — 99232 SBSQ HOSP IP/OBS MODERATE 35: CPT | Performed by: HOSPITALIST

## 2021-12-01 RX ORDER — ONDANSETRON 2 MG/ML
4 INJECTION INTRAMUSCULAR; INTRAVENOUS EVERY 6 HOURS PRN
Status: DISCONTINUED | OUTPATIENT
Start: 2021-12-01 | End: 2021-12-03

## 2021-12-01 NOTE — PROGRESS NOTES
Unable to get UA PT removes purwick and is incontinent and the brief absorbs. PT also can run SB on tele.

## 2021-12-01 NOTE — CM/SW NOTE
MSW reviewed chart and spoke to staff at [facility]. Pt is a long term resident, updates sent. MSW called pt's dtr at 11:30am after 10 + rings with no VM MSW hung up.

## 2021-12-01 NOTE — PROGRESS NOTES
[facility]     Hospitalist Progress Note     Carlito Christensen Patient Status:  Observation    10/17/1940 MRN [id number]   [facility] 3NE-A Attending [facility] Day # 0 PCP Cleopatra Craig DO     Chief Complaint: 168 hours. Inflammatory Markers  No results for input(s): CRP, NIDIA, LDH, DDIMER in the last 168 hours. Imaging: Imaging data reviewed in Epic.     Medications:   • piperacillin-tazobactam  3.375 g Intravenous Q8H   • aspirin  324 mg Oral Once   • done

## 2021-12-01 NOTE — CONSULTS
[facility]                       Gastroenterology [address] [facility] Gastroenterology    Robin Prater Patient Status:  Observation    10/17/1940 MRN [id number]   [facility] 3NE-A Attending Jennifer [address]  [place] Day # 12/14/2015    Procedure: TRANSFORAMINAL EPIDURAL STEROID INJECTION;  Surgeon: Bowen Small MD;  Location: [facility]   • INJECTION, ANESTHETIC/STEROID, TRANSFORAMINAL EPIDURAL; LUMBAR/SACRAL, SINGLE LEVEL Bilateral 11/16/2015    Procedure: [facility] Medications: aspirin chewable tab 324 mg, 324 mg, Oral, Once  [COMPLETED] sodium chloride 0.9% IV bolus 1,905 mL, 30 mL/kg, Intravenous, Once  [COMPLETED] iohexol (OMNIPAQUE) 350 MG/ML injection 100 mL, 100 mL, Intravenous, ONCE PRN  donepezil (FRANCESCA sinus congestion, tinnitus             Neurologic: + dementia     PE: BP 94/57 (BP Location: Left arm)   Pulse 67   Temp 97.4 °F (36.3 °C) (Oral)   Resp 18   Ht 5' 5\" (1.651 m)   Wt 140 lb (63.5 kg)   LMP 08/28/1970 (LMP Unknown)   SpO2 99%   BMI 23.30 kg Recent Labs   Lab 11/30/21  1607 11/30/21  1759   ALT 20  --    AST  --  20       Imaging:     PROCEDURE:  CT ABDOMEN PELVIS IV CONTRAST, NO ORAL (ER)       COMPARISON:  [facility] , XR, XR CHEST AP PORTABLE  (CPT=71045), 11/03/2019, 8:30 AM.       INDIC dilatation.  Small amount of free fluid in the   pelvis.  No free air.  Sigmoid colonic diverticulosis. ABDOMINAL WALL:  Small fat containing umbilical hernia.    PELVIC ORGANS:  Unremarkable urinary bladder.  Vascular calcifications along the uterus. Suzanne Yates start Zosyn given leukocytosis, lactic acidosis, and hypotension   3. Pain management per PCP recommendations; Zofran 4 mg IV q 6 PRN nausea  4. Trial of a clear liquid diet encouraging smaller more frequent meals   5.  Monitor for overt GI bleeding--transf

## 2021-12-01 NOTE — H&P
[facility] HOSPITALIST  History and Physical     Yuliana Wyatt Patient Status:  Observation    10/17/1940 MRN [id number]   [facility] 3NE-A Attending Beba Alves MD   Hosp Day # 0 PCP Nola Martínez DO     Chief Complaint: abd pain ANESTHETIC/STEROID, TRANSFORAMINAL EPIDURAL; LUMBAR/SACRAL, SINGLE LEVEL Bilateral 2015    Procedure: TRANSFORAMINAL EPIDURAL STEROID INJECTION;  Surgeon: Moy Wright MD;  Location: [facility]   •      • PATIENT DOCUMENTED NOT TO HAVE EX Take 100 mg by mouth daily.), Disp: 30 tablet, Rfl: 2  Quinapril HCl 10 MG Oral Tab, Take 1 tablet (10 mg total) by mouth once daily. , Disp: 30 tablet, Rfl: 2  Donepezil HCl 5 MG Oral Tab, Take 1 tablet (5 mg total) by mouth nightly., Disp: 30 tablet, Rfl: BILT 0.4  --    TP 7.7  --        Estimated Creatinine Clearance: 36.3 mL/min (A) (based on SCr of 1.13 mg/dL (H)). No results for input(s): PTP, INR in the last 168 hours.     COVID-19 Lab Results    COVID-19  Lab Results   Component Value Date    CO

## 2021-12-01 NOTE — PLAN OF CARE
Pt. A&Ox1  RA;VSS  Tele- NSR  Purewick in place  IV zosyn started  0.9 @ 125  QID accucheck  Clear liquid diet  GI to see  Staff will continue to monitor     Will not keep tele on, Orders from MD erickson to dc it     No stool this shift.        Problem: Safety

## 2021-12-01 NOTE — ED QUICK NOTES
Orders for admission, patient is aware of plan and ready to go upstairs. Any questions, please call ED RN JULIO   at extension [phone]. Vaccinated? YES   Type of COVID test sent:RAPID   COVID Suspicion level: Low      Titratable drug(s) infusing:N/A  Rate

## 2021-12-01 NOTE — PROGRESS NOTES
NURSING ADMISSION NOTE      Patient admitted via Cart  Oriented to room. Safety precautions initiated. Bed in low position. Call light in reach. PT alert and orientated times 1 (self). Confused. Hx of dementia. NSR on tele. RA saturating at 100%.

## 2021-12-02 PROCEDURE — 99232 SBSQ HOSP IP/OBS MODERATE 35: CPT | Performed by: HOSPITALIST

## 2021-12-02 NOTE — OCCUPATIONAL THERAPY NOTE
Attempted to see pt for OT eval this AM and pt adamantly refusing and appearing to get more agitated when asked questions. RN aware. Will continue to follow for eval as pt approp.

## 2021-12-02 NOTE — PHYSICAL THERAPY NOTE
Attempted to see pt for PT eval this AM and pt adamantly refusing and appearing to get more agitated when asked questions. RN aware. Will continue to follow. Thank you.

## 2021-12-02 NOTE — PLAN OF CARE
Received patient with Posey vest. Patient alert to self, confused and restless at times. Patient in room air. Patient off tele Patient incontinent at times but will ask for bed pan. Patient denied any pain at this time.   Patient both PIV flushed and assess

## 2021-12-02 NOTE — PROGRESS NOTES
[facility]     Hospitalist Progress Note     Doug Sierra Patient Status:  Observation    10/17/1940 MRN [id number]   [facility] 3NE-A Attending Magda Whitaker[address] Day # 1 PCP Cesar Hanson DO     Chief Complaint: 168 hours. Inflammatory Markers  No results for input(s): CRP, NIDIA, LDH, DDIMER in the last 168 hours. Imaging: Imaging data reviewed in Epic.     Medications:   • piperacillin-tazobactam  3.375 g Intravenous Q8H   • aspirin  324 mg Oral Once   • done

## 2021-12-02 NOTE — PLAN OF CARE
Assumed pt care at 0730. A&Ox1, restless & verbally abusive. VSS. Room air. Pt refusing tele. Christian vest in place. 1st R forearm PIV infusing 0.9NS @ 125 mL/hr, 2nd R forearm PIV infusing IV Zosyn Q8H. Clear liquid diet, denies N/V. Denies pain.  Voiding, b

## 2021-12-03 VITALS
WEIGHT: 140 LBS | TEMPERATURE: 98 F | DIASTOLIC BLOOD PRESSURE: 56 MMHG | HEART RATE: 57 BPM | HEIGHT: 65 IN | OXYGEN SATURATION: 98 % | SYSTOLIC BLOOD PRESSURE: 120 MMHG | BODY MASS INDEX: 23.32 KG/M2 | RESPIRATION RATE: 18 BRPM

## 2021-12-03 PROCEDURE — 99239 HOSP IP/OBS DSCHRG MGMT >30: CPT | Performed by: HOSPITALIST

## 2021-12-03 RX ORDER — CIPROFLOXACIN 500 MG/1
500 TABLET, FILM COATED ORAL 2 TIMES DAILY
Qty: 24 TABLET | Refills: 0 | Status: SHIPPED | OUTPATIENT
Start: 2021-12-03 | End: 2021-12-15

## 2021-12-03 RX ORDER — METRONIDAZOLE 500 MG/1
500 TABLET ORAL 3 TIMES DAILY
Qty: 36 TABLET | Refills: 0 | Status: SHIPPED | OUTPATIENT
Start: 2021-12-03 | End: 2021-12-15

## 2021-12-03 NOTE — PLAN OF CARE
Posey restraint removed from patient at 0000 because patient was less agitated and cognizant of the importance of the IV fluids and her risk of falling out of bed and injuring herself. Patient Sleeping at this time. , refused tele.

## 2021-12-03 NOTE — PLAN OF CARE
Assumed care of pt today at 0730. Pt is A&O x1. Pt restraints have been discontinued on the overnight shift. Pt. Is trying to get out of bed but is redirectable. Bed alarm on. Pt is incontinent and needs assistance to eat and drink. IV zosyn.   IV 0.9

## 2021-12-03 NOTE — CDS QUERY
Uncertain Diagnosis  [facility]  Dear Dr. Adalgisa Fletcher,  Clinical information (provided below) indicates a documented diagnosis of Sepsis.  For accurate ICD-10-CM code assignment,   PLEASE clarify the documented diagnosis of seps

## 2021-12-03 NOTE — PROGRESS NOTES
[facility]  Progress Note    Roland Hercules Patient Status:  Inpatient    10/17/1940 MRN [id number]   [facility] 3NE-A Attending Rock Kyle*   Date 2021 PCP Charisma Tomas DO     Subjective:  Roland Hercules CT  Leukocytosis improving    Plan:  Conservative management  Empiric PPI daily  Empiric antibiotics transition to oral when tolerating PO to cipro/flagyl x 7-10 days  Advance diet to soft as tolerated  Call if any questions    Total time spent in the care

## 2021-12-03 NOTE — CM/SW NOTE
CM spoke to patient's daughter Reagan Dean to update on discharge status. Patient discharge pending GI clearance. [facility] placed on will call for transport to the [place], [place] form completed. RN to call [phone] for transfer report.

## 2021-12-03 NOTE — PROGRESS NOTES
[facility]     Hospitalist Progress Note     Whitney Tubbs Patient Status:  Observation    10/17/1940 MRN [id number]   [facility] 3NE-A [facility] Jodi Harris DO     Chief Complaint: results for input(s): CK in the last 168 hours. Inflammatory Markers  No results for input(s): CRP, NIDIA, LDH, DDIMER in the last 168 hours. Imaging: Imaging data reviewed in Epic.     Medications:   • piperacillin-tazobactam  3.375 g Intravenous Q8H

## 2021-12-03 NOTE — PHYSICAL THERAPY NOTE
PHYSICAL THERAPY QUICK EVALUATION - INPATIENT    Room Number: 7885/0585-E  Evaluation Date: 12/3/2021  Presenting Problem: Increased lactic acid, colitis, hypotension and alzheimer dementia  Co-Morbidities : Alzheimer's dementia;s/p frequent falls  Physi Procedure: TRANSFORAMINAL EPIDURAL STEROID INJECTION;  Surgeon: Filippo Felix MD;  Location: [facility]   • INJECTION, ANESTHETIC/STEROID, TRANSFORAMINAL EPIDURAL; LUMBAR/SACRAL, SINGLE LEVEL Bilateral 11/16/2015    Procedure: TRANSFORAMINAL EPIDURA of [place]: Pt is a poor historian and intermittently irritable.  Pt reports that she is indep c ADLs and amb without device; pt is a LTC resident at [facility] and has had freq falls d/t her cognitive issues and will get up and move quickly    SUBJECTI (HHA)  Pattern: Shuffle;L Foot flat;R Foot flat;Comment (intermittent M/L sway and steppage gait)    Skilled Therapy Provided: Pt presents to PT lying in semi supine position in bed.  Pt is agreeable to participate and irritated about her brief currently be care/supervision; Other (Comment) (back to LTC)    PLAN  Patient has been evaluated and presents with no skilled Physical Therapy needs at this time. Patient discharged from Physical Therapy services.   Please re-order if a new functional limitation present

## 2021-12-04 NOTE — OCCUPATIONAL THERAPY NOTE
OCCUPATIONAL THERAPY QUICK EVALUATION - INPATIENT    Room Number: 0406/9258-N  Evaluation Date: 12/3/2021     Type of Evaluation: Quick Eval  Presenting Problem: elevated lactic acid level, colitis    Physician Order: IP Consult to Occupational Therapy  Re [facility]   • INJECTION, ANESTHETIC/STEROID, TRANSFORAMINAL EPIDURAL; LUMBAR/SACRAL, SINGLE LEVEL Bilateral 12/14/2015    Procedure: TRANSFORAMINAL EPIDURAL STEROID INJECTION;  Surgeon: Monika Juarez MD;  Location: [facility]   • INJECTION, ANE home    OBJECTIVE  Precautions: Bed/chair alarm  Fall Risk: High fall risk    WEIGHT BEARING RESTRICTION                PAIN ASSESSMENT  Ratin  Location: denies       COGNITION  Alert, oriented to self only  Follows simple motor commands with increased assist and CGA w/ cueing throughout for safety    Patient End of Session: With [facility] staff;Needs met;Call light within reach;RN aware of session/findings; All patient questions and concerns addressed; Alarm set; In bed    ASSESSMENT     Patient is a 80year old

## 2021-12-04 NOTE — PLAN OF CARE
Pt discharged off [facility] to the [facility]. All paperwork given to EMS. Pt discharged in stable condition.

## 2021-12-06 NOTE — DISCHARGE SUMMARY
[facility] HOSPITALIST  DISCHARGE SUMMARY     Jermaine Rizzo Patient Status:  Inpatient    10/17/1940 MRN [id number]   [facility] 3NE-A Attending No att. providers found   Hosp Day # 2 PCP Danya Chinchilla DO     Date of Admission:  days.   Stop taking on: December 15, 2021  Quantity: 24 tablet  Refills: 0     metRONIDAZOLE 500 MG Tabs  Commonly known as: FLAGYL      Take 1 tablet (500 mg total) by mouth 3 (three) times daily for 12 days.    Stop taking on: December 15, 2021  Quantity: nontender, nondistended. Positive bowel sounds. No rebound or guarding. Neurologic: No focal neurological deficits. Musculoskeletal: Moves all extremities. Extremities: No edema.   -----------------------------------------------------------------------

## 2021-12-19 ENCOUNTER — EXTERNAL FACILITY (OUTPATIENT)
Dept: FAMILY MEDICINE CLINIC | Facility: CLINIC | Age: 81
End: 2021-12-19

## 2021-12-19 DIAGNOSIS — I10 BENIGN ESSENTIAL HTN: ICD-10-CM

## 2021-12-19 DIAGNOSIS — G30.9 ALZHEIMER'S DEMENTIA WITH BEHAVIORAL DISTURBANCE, UNSPECIFIED TIMING OF DEMENTIA ONSET (HCC): ICD-10-CM

## 2021-12-19 DIAGNOSIS — F02.81 ALZHEIMER'S DEMENTIA WITH BEHAVIORAL DISTURBANCE, UNSPECIFIED TIMING OF DEMENTIA ONSET (HCC): ICD-10-CM

## 2021-12-19 DIAGNOSIS — R53.81 PHYSICAL DECONDITIONING: ICD-10-CM

## 2021-12-19 DIAGNOSIS — F33.1 MAJOR DEPRESSIVE DISORDER, RECURRENT EPISODE, MODERATE (HCC): Chronic | ICD-10-CM

## 2021-12-19 DIAGNOSIS — N39.46 MIXED STRESS AND URGE URINARY INCONTINENCE: ICD-10-CM

## 2021-12-19 DIAGNOSIS — E03.9 HYPOTHYROIDISM, UNSPECIFIED TYPE: ICD-10-CM

## 2021-12-19 DIAGNOSIS — R26.81 UNSTEADY GAIT: ICD-10-CM

## 2021-12-19 DIAGNOSIS — A41.9 SEPSIS, DUE TO UNSPECIFIED ORGANISM, UNSPECIFIED WHETHER ACUTE ORGAN DYSFUNCTION PRESENT (HCC): Primary | ICD-10-CM

## 2021-12-19 DIAGNOSIS — K52.9 COLITIS: ICD-10-CM

## 2021-12-19 DIAGNOSIS — M47.816 ARTHRITIS, LUMBAR SPINE: ICD-10-CM

## 2021-12-19 DIAGNOSIS — M48.061 SPINAL STENOSIS OF LUMBAR REGION, UNSPECIFIED WHETHER NEUROGENIC CLAUDICATION PRESENT: ICD-10-CM

## 2021-12-19 PROCEDURE — 99306 1ST NF CARE HIGH MDM 50: CPT | Performed by: FAMILY MEDICINE

## 2021-12-19 PROCEDURE — 1111F DSCHRG MED/CURRENT MED MERGE: CPT | Performed by: FAMILY MEDICINE

## 2021-12-29 PROBLEM — F02.80 ALZHEIMER'S DEMENTIA WITHOUT BEHAVIORAL DISTURBANCE, UNSPECIFIED TIMING OF DEMENTIA ONSET (HCC): Status: RESOLVED | Noted: 2021-11-30 | Resolved: 2021-12-29

## 2021-12-29 PROBLEM — F33.0 MAJOR DEPRESSIVE DISORDER, RECURRENT EPISODE, MILD (HCC): Status: RESOLVED | Noted: 2021-09-09 | Resolved: 2021-12-29

## 2021-12-29 PROBLEM — R53.81 PHYSICAL DECONDITIONING: Status: ACTIVE | Noted: 2021-12-29

## 2021-12-29 PROBLEM — G30.9 ALZHEIMER'S DEMENTIA WITHOUT BEHAVIORAL DISTURBANCE, UNSPECIFIED TIMING OF DEMENTIA ONSET (HCC): Status: RESOLVED | Noted: 2021-11-30 | Resolved: 2021-12-29

## 2021-12-29 PROBLEM — I95.9 HYPOTENSION, UNSPECIFIED HYPOTENSION TYPE: Status: RESOLVED | Noted: 2021-11-30 | Resolved: 2021-12-29

## 2021-12-29 PROBLEM — G30.9 ALZHEIMER'S DEMENTIA WITHOUT BEHAVIORAL DISTURBANCE, UNSPECIFIED TIMING OF DEMENTIA ONSET: Status: RESOLVED | Noted: 2021-11-30 | Resolved: 2021-12-29

## 2021-12-29 PROBLEM — F02.80 ALZHEIMER'S DEMENTIA WITHOUT BEHAVIORAL DISTURBANCE, UNSPECIFIED TIMING OF DEMENTIA ONSET: Status: RESOLVED | Noted: 2021-11-30 | Resolved: 2021-12-29

## 2021-12-29 NOTE — PROGRESS NOTES
HPI:    Siena Patiño is a 80year old female who presents for Follow - Up Bob Rai of [place] readmission from hospital for sepsis, )   Readmission from [facility], LTC patient  Date of Admission: 11/30/2021  Date of Discharge: 12/3/2021  Discha (Bilateral, 11/16/2015); patient withough preoperative order for iv antibiotic surgical site infection prophylaxis.  (Bilateral, 11/16/2015); patient documented not to have experienced any of the following events (Bilateral, 11/16/2015); injection, anesthet mass index is 23.3 kg/m² as calculated from the following:    Height as of 11/30/21: 5' 5\" (1.651 m). Weight as of 11/30/21: 140 lb (63.5 kg).     General Appearance:  Alert, cooperative, no distress, appears stated age   Head:  Normocephalic, without o lumbar region, unspecified whether neurogenic claudication present  Hypothyroidism, unspecified type  Colitis  Benign essential HTN  Arthritis, lumbar spine  Alzheimer's dementia with behavioral disturbance, unspecified timing of dementia onset [facility]  Phys

## 2022-04-04 ENCOUNTER — APPOINTMENT (OUTPATIENT)
Dept: CT IMAGING | Facility: HOSPITAL | Age: 82
End: 2022-04-04
Attending: EMERGENCY MEDICINE
Payer: MEDICARE

## 2022-04-04 ENCOUNTER — HOSPITAL ENCOUNTER (EMERGENCY)
Facility: HOSPITAL | Age: 82
Discharge: HOME | End: 2022-04-04
Attending: EMERGENCY MEDICINE
Payer: MEDICARE

## 2022-04-04 VITALS
TEMPERATURE: 98 F | DIASTOLIC BLOOD PRESSURE: 82 MMHG | SYSTOLIC BLOOD PRESSURE: 151 MMHG | HEIGHT: 62 IN | RESPIRATION RATE: 16 BRPM | OXYGEN SATURATION: 95 % | BODY MASS INDEX: 22.31 KG/M2 | HEART RATE: 57 BPM | WEIGHT: 121.25 LBS

## 2022-04-04 DIAGNOSIS — S01.81XA FACIAL LACERATION, INITIAL ENCOUNTER: ICD-10-CM
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
## 2022-04-04 LAB
ALBUMIN SERPL-MCNC: 3.5 G/DL (ref 3.4–5)
ALBUMIN/GLOB SERPL: 0.9 {RATIO} (ref 1–2)
ALP LIVER SERPL-CCNC: 75 U/L
ALT SERPL-CCNC: 24 U/L
ANION GAP SERPL CALC-SCNC: 4 MMOL/L (ref 0–18)
AST SERPL-CCNC: 31 U/L (ref 15–37)
ATRIAL RATE: 65 BPM
BASOPHILS # BLD AUTO: 0.07 X10(3) UL (ref 0–0.2)
BASOPHILS NFR BLD AUTO: 1.2 %
BILIRUB SERPL-MCNC: 0.7 MG/DL (ref 0.1–2)
BUN BLD-MCNC: 19 MG/DL (ref 7–18)
CALCIUM BLD-MCNC: 9.8 MG/DL (ref 8.5–10.1)
CHLORIDE SERPL-SCNC: 107 MMOL/L (ref 98–112)
CO2 SERPL-SCNC: 29 MMOL/L (ref 21–32)
CREAT BLD-MCNC: 0.73 MG/DL
EOSINOPHIL # BLD AUTO: 0.2 X10(3) UL (ref 0–0.7)
EOSINOPHIL NFR BLD AUTO: 3.4 %
ERYTHROCYTE [DISTWIDTH] IN BLOOD BY AUTOMATED COUNT: 13.8 %
GLOBULIN PLAS-MCNC: 4 G/DL (ref 2.8–4.4)
GLUCOSE BLD-MCNC: 76 MG/DL (ref 70–99)
GLUCOSE BLD-MCNC: 85 MG/DL (ref 70–99)
HCT VFR BLD AUTO: 40 %
HGB BLD-MCNC: 13.4 G/DL
IMM GRANULOCYTES # BLD AUTO: 0.03 X10(3) UL (ref 0–1)
IMM GRANULOCYTES NFR BLD: 0.5 %
LYMPHOCYTES # BLD AUTO: 1.96 X10(3) UL (ref 1–4)
LYMPHOCYTES NFR BLD AUTO: 33.1 %
MCH RBC QN AUTO: 30.6 PG (ref 26–34)
MCHC RBC AUTO-ENTMCNC: 33.5 G/DL (ref 31–37)
MCV RBC AUTO: 91.3 FL
MONOCYTES # BLD AUTO: 0.86 X10(3) UL (ref 0.1–1)
MONOCYTES NFR BLD AUTO: 14.5 %
NEUTROPHILS # BLD AUTO: 2.8 X10 (3) UL (ref 1.5–7.7)
NEUTROPHILS # BLD AUTO: 2.8 X10(3) UL (ref 1.5–7.7)
NEUTROPHILS NFR BLD AUTO: 47.3 %
OSMOLALITY SERPL CALC.SUM OF ELEC: 292 MOSM/KG (ref 275–295)
P AXIS: 42 DEGREES
P-R INTERVAL: 172 MS
PLATELET # BLD AUTO: 171 10(3)UL (ref 150–450)
POTASSIUM SERPL-SCNC: 4.8 MMOL/L (ref 3.5–5.1)
PROT SERPL-MCNC: 7.5 G/DL (ref 6.4–8.2)
Q-T INTERVAL: 396 MS
QRS DURATION: 74 MS
QTC CALCULATION (BEZET): 411 MS
R AXIS: -57 DEGREES
RBC # BLD AUTO: 4.38 X10(6)UL
SODIUM SERPL-SCNC: 140 MMOL/L (ref 136–145)
T AXIS: 69 DEGREES
VENTRICULAR RATE: 65 BPM
WBC # BLD AUTO: 5.9 X10(3) UL (ref 4–11)

## 2022-04-04 PROCEDURE — 72125 CT NECK SPINE W/O DYE: CPT | Performed by: EMERGENCY MEDICINE

## 2022-04-04 PROCEDURE — 99285 EMERGENCY DEPT VISIT HI MDM: CPT | Performed by: EMERGENCY MEDICINE

## 2022-04-04 PROCEDURE — 70450 CT HEAD/BRAIN W/O DYE: CPT | Performed by: EMERGENCY MEDICINE

## 2022-04-04 PROCEDURE — 90471 IMMUNIZATION ADMIN: CPT | Performed by: EMERGENCY MEDICINE

## 2022-04-04 PROCEDURE — 82962 GLUCOSE BLOOD TEST: CPT

## 2022-04-04 PROCEDURE — 96360 HYDRATION IV INFUSION INIT: CPT | Performed by: EMERGENCY MEDICINE

## 2022-04-04 PROCEDURE — 93010 ELECTROCARDIOGRAM REPORT: CPT | Performed by: EMERGENCY MEDICINE

## 2022-04-04 PROCEDURE — 80053 COMPREHEN METABOLIC PANEL: CPT | Performed by: EMERGENCY MEDICINE

## 2022-04-04 PROCEDURE — 12011 RPR F/E/E/N/L/M 2.5 CM/<: CPT | Performed by: EMERGENCY MEDICINE

## 2022-04-04 PROCEDURE — 85025 COMPLETE CBC W/AUTO DIFF WBC: CPT | Performed by: EMERGENCY MEDICINE

## 2022-04-04 PROCEDURE — 96361 HYDRATE IV INFUSION ADD-ON: CPT | Performed by: EMERGENCY MEDICINE

## 2022-04-04 PROCEDURE — 93005 ELECTROCARDIOGRAM TRACING: CPT

## 2022-04-04 RX ORDER — TETANUS AND DIPHTHERIA TOXOIDS ADSORBED 2; 2 [LF]/.5ML; [LF]/.5ML
0.5 INJECTION INTRAMUSCULAR ONCE
Status: COMPLETED | OUTPATIENT
Start: 2022-04-04 | End: 2022-04-04

## 2022-04-07 PROBLEM — F33.0 MAJOR DEPRESSIVE DISORDER, RECURRENT EPISODE, MILD (HCC): Chronic | Status: ACTIVE | Noted: 2021-09-09

## 2022-04-07 PROBLEM — G30.9 ALZHEIMER'S DEMENTIA WITH BEHAVIORAL DISTURBANCE (HCC): Chronic | Status: ACTIVE | Noted: 2021-05-25

## 2022-04-07 PROBLEM — F02.818 ALZHEIMER'S DEMENTIA WITH BEHAVIORAL DISTURBANCE (HCC): Chronic | Status: ACTIVE | Noted: 2021-05-25

## 2022-04-07 PROBLEM — F02.81 ALZHEIMER'S DEMENTIA WITH BEHAVIORAL DISTURBANCE (HCC): Chronic | Status: ACTIVE | Noted: 2021-05-25

## 2022-04-07 PROBLEM — F33.1 MAJOR DEPRESSIVE DISORDER, RECURRENT EPISODE, MODERATE (HCC): Chronic | Status: RESOLVED | Noted: 2021-05-25 | Resolved: 2022-04-07

## 2022-04-30 PROBLEM — A41.9 SEPSIS, DUE TO UNSPECIFIED ORGANISM, UNSPECIFIED WHETHER ACUTE ORGAN DYSFUNCTION PRESENT (HCC): Status: RESOLVED | Noted: 2021-11-30 | Resolved: 2022-04-30

## 2022-04-30 PROBLEM — R79.89 ELEVATED LACTIC ACID LEVEL: Status: RESOLVED | Noted: 2021-11-30 | Resolved: 2022-04-30

## 2022-04-30 PROBLEM — R19.7 DIARRHEA, UNSPECIFIED TYPE: Status: RESOLVED | Noted: 2021-11-30 | Resolved: 2022-04-30

## 2022-06-23 ENCOUNTER — LAB REQUISITION (OUTPATIENT)
Dept: LAB | Facility: HOSPITAL | Age: 82
End: 2022-06-23
Payer: MEDICARE

## 2022-06-23 DIAGNOSIS — Z79.899 OTHER LONG TERM (CURRENT) DRUG THERAPY: ICD-10-CM

## 2022-06-23 LAB
ALBUMIN SERPL-MCNC: 3.3 G/DL (ref 3.4–5)
ALBUMIN/GLOB SERPL: 1 {RATIO} (ref 1–2)
ALP LIVER SERPL-CCNC: 95 U/L
ALT SERPL-CCNC: 17 U/L
ANION GAP SERPL CALC-SCNC: 5 MMOL/L (ref 0–18)
AST SERPL-CCNC: 19 U/L (ref 15–37)
BASOPHILS # BLD AUTO: 0.05 X10(3) UL (ref 0–0.2)
BASOPHILS NFR BLD AUTO: 0.7 %
BILIRUB SERPL-MCNC: 0.3 MG/DL (ref 0.1–2)
BUN BLD-MCNC: 18 MG/DL (ref 7–18)
CALCIUM BLD-MCNC: 9 MG/DL (ref 8.5–10.1)
CHLORIDE SERPL-SCNC: 106 MMOL/L (ref 98–112)
CO2 SERPL-SCNC: 27 MMOL/L (ref 21–32)
CREAT BLD-MCNC: 0.66 MG/DL
D DIMER PPP FEU-MCNC: 0.61 UG/ML FEU (ref ?–0.79)
EOSINOPHIL # BLD AUTO: 0.21 X10(3) UL (ref 0–0.7)
EOSINOPHIL NFR BLD AUTO: 3 %
ERYTHROCYTE [DISTWIDTH] IN BLOOD BY AUTOMATED COUNT: 13.2 %
GLOBULIN PLAS-MCNC: 3.3 G/DL (ref 2.8–4.4)
GLUCOSE BLD-MCNC: 109 MG/DL (ref 70–99)
HCT VFR BLD AUTO: 40.3 %
HGB BLD-MCNC: 13 G/DL
IMM GRANULOCYTES # BLD AUTO: 0.02 X10(3) UL (ref 0–1)
IMM GRANULOCYTES NFR BLD: 0.3 %
LYMPHOCYTES # BLD AUTO: 1.06 X10(3) UL (ref 1–4)
LYMPHOCYTES NFR BLD AUTO: 15 %
MCH RBC QN AUTO: 30.7 PG (ref 26–34)
MCHC RBC AUTO-ENTMCNC: 32.3 G/DL (ref 31–37)
MCV RBC AUTO: 95.3 FL
MONOCYTES # BLD AUTO: 0.89 X10(3) UL (ref 0.1–1)
MONOCYTES NFR BLD AUTO: 12.6 %
NEUTROPHILS # BLD AUTO: 4.84 X10 (3) UL (ref 1.5–7.7)
NEUTROPHILS # BLD AUTO: 4.84 X10(3) UL (ref 1.5–7.7)
NEUTROPHILS NFR BLD AUTO: 68.4 %
OSMOLALITY SERPL CALC.SUM OF ELEC: 288 MOSM/KG (ref 275–295)
PLATELET # BLD AUTO: 170 10(3)UL (ref 150–450)
POTASSIUM SERPL-SCNC: 4.1 MMOL/L (ref 3.5–5.1)
PROT SERPL-MCNC: 6.6 G/DL (ref 6.4–8.2)
RBC # BLD AUTO: 4.23 X10(6)UL
SODIUM SERPL-SCNC: 138 MMOL/L (ref 136–145)
WBC # BLD AUTO: 7.1 X10(3) UL (ref 4–11)

## 2022-06-23 PROCEDURE — 80053 COMPREHEN METABOLIC PANEL: CPT | Performed by: NURSE PRACTITIONER

## 2022-06-23 PROCEDURE — 85025 COMPLETE CBC W/AUTO DIFF WBC: CPT | Performed by: NURSE PRACTITIONER

## 2022-06-23 PROCEDURE — 85379 FIBRIN DEGRADATION QUANT: CPT | Performed by: NURSE PRACTITIONER

## 2022-06-26 ENCOUNTER — APPOINTMENT (OUTPATIENT)
Dept: CT IMAGING | Facility: HOSPITAL | Age: 82
End: 2022-06-26
Attending: EMERGENCY MEDICINE
Payer: MEDICARE

## 2022-06-26 ENCOUNTER — HOSPITAL ENCOUNTER (EMERGENCY)
Facility: HOSPITAL | Age: 82
Discharge: HOME | End: 2022-06-26
Attending: EMERGENCY MEDICINE
Payer: MEDICARE

## 2022-06-26 VITALS
OXYGEN SATURATION: 96 % | TEMPERATURE: 98 F | SYSTOLIC BLOOD PRESSURE: 110 MMHG | HEART RATE: 81 BPM | RESPIRATION RATE: 16 BRPM | DIASTOLIC BLOOD PRESSURE: 77 MMHG

## 2022-06-26 DIAGNOSIS — S09.90XA CLOSED HEAD INJURY, INITIAL ENCOUNTER: Primary | ICD-10-CM
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
## 2022-06-26 PROCEDURE — 70450 CT HEAD/BRAIN W/O DYE: CPT | Performed by: EMERGENCY MEDICINE

## 2022-06-26 PROCEDURE — 99284 EMERGENCY DEPT VISIT MOD MDM: CPT

## 2022-06-26 PROCEDURE — 72125 CT NECK SPINE W/O DYE: CPT | Performed by: EMERGENCY MEDICINE

## 2022-06-26 NOTE — ED INITIAL ASSESSMENT (HPI)
Patient had unwitnessed fall around 5pm, able to get back up after. No abrasion noted to forehead. A/Ox1 at baseline. Denies blood thinners. Covid (+) on Wednesday.

## 2022-06-30 ENCOUNTER — HOSPITAL ENCOUNTER (EMERGENCY)
Facility: HOSPITAL | Age: 82
Discharge: HOME | End: 2022-07-01
Attending: EMERGENCY MEDICINE
Payer: MEDICARE

## 2022-06-30 ENCOUNTER — APPOINTMENT (OUTPATIENT)
Dept: CT IMAGING | Facility: HOSPITAL | Age: 82
End: 2022-06-30
Attending: EMERGENCY MEDICINE
Payer: MEDICARE
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
## 2022-06-30 DIAGNOSIS — S09.90XA INJURY OF HEAD, INITIAL ENCOUNTER: Primary | ICD-10-CM

## 2022-06-30 PROCEDURE — 72125 CT NECK SPINE W/O DYE: CPT | Performed by: EMERGENCY MEDICINE

## 2022-06-30 PROCEDURE — 99284 EMERGENCY DEPT VISIT MOD MDM: CPT

## 2022-06-30 PROCEDURE — 70450 CT HEAD/BRAIN W/O DYE: CPT | Performed by: EMERGENCY MEDICINE

## 2022-07-01 VITALS
SYSTOLIC BLOOD PRESSURE: 117 MMHG | TEMPERATURE: 98 F | RESPIRATION RATE: 20 BRPM | DIASTOLIC BLOOD PRESSURE: 58 MMHG | OXYGEN SATURATION: 100 % | HEART RATE: 62 BPM | BODY MASS INDEX: 22 KG/M2 | WEIGHT: 121.25 LBS

## 2022-07-07 ENCOUNTER — APPOINTMENT (OUTPATIENT)
Dept: CT IMAGING | Facility: HOSPITAL | Age: 82
End: 2022-07-07
Attending: EMERGENCY MEDICINE
Payer: MEDICARE

## 2022-07-07 ENCOUNTER — HOSPITAL ENCOUNTER (EMERGENCY)
Facility: HOSPITAL | Age: 82
Discharge: HOME | End: 2022-07-07
Attending: EMERGENCY MEDICINE
Payer: MEDICARE

## 2022-07-07 VITALS
TEMPERATURE: 98 F | WEIGHT: 121.25 LBS | HEIGHT: 65 IN | HEART RATE: 64 BPM | BODY MASS INDEX: 20.2 KG/M2 | DIASTOLIC BLOOD PRESSURE: 58 MMHG | OXYGEN SATURATION: 98 % | SYSTOLIC BLOOD PRESSURE: 104 MMHG | RESPIRATION RATE: 18 BRPM
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
## 2022-07-07 DIAGNOSIS — S00.03XA CONTUSION OF SCALP, INITIAL ENCOUNTER: ICD-10-CM

## 2022-07-07 PROCEDURE — 70450 CT HEAD/BRAIN W/O DYE: CPT | Performed by: EMERGENCY MEDICINE

## 2022-07-07 PROCEDURE — 99284 EMERGENCY DEPT VISIT MOD MDM: CPT

## 2022-07-07 PROCEDURE — 72125 CT NECK SPINE W/O DYE: CPT | Performed by: EMERGENCY MEDICINE

## 2022-07-07 RX ORDER — SENNOSIDES 8.6 MG
8.6 TABLET ORAL 2 TIMES DAILY
COMMUNITY

## 2022-07-07 RX ORDER — BISACODYL 10 MG
SUPPOSITORY, RECTAL RECTAL DAILY PRN
COMMUNITY

## 2022-07-08 NOTE — ED QUICK NOTES
Ambulance company called: [facility] called for transport returning to [facility].  ETA given is 30 mins

## 2022-07-08 NOTE — ED INITIAL ASSESSMENT (HPI)
Unwitnessed fall. Per EMS, pt was found in the hallway at her nursing facility. No blood thinners. Pt did hit head. No laceration noted on inspection.

## 2022-07-21 ENCOUNTER — APPOINTMENT (OUTPATIENT)
Dept: CT IMAGING | Facility: HOSPITAL | Age: 82
End: 2022-07-21
Attending: EMERGENCY MEDICINE
Payer: MEDICARE

## 2022-07-21 ENCOUNTER — APPOINTMENT (OUTPATIENT)
Dept: GENERAL RADIOLOGY | Facility: HOSPITAL | Age: 82
End: 2022-07-21
Attending: EMERGENCY MEDICINE
Payer: MEDICARE

## 2022-07-21 ENCOUNTER — HOSPITAL ENCOUNTER (EMERGENCY)
Facility: HOSPITAL | Age: 82
Discharge: HOME | End: 2022-07-21
Attending: EMERGENCY MEDICINE
Payer: MEDICARE

## 2022-07-21 VITALS
HEART RATE: 58 BPM | OXYGEN SATURATION: 98 % | WEIGHT: 121.25 LBS | DIASTOLIC BLOOD PRESSURE: 63 MMHG | SYSTOLIC BLOOD PRESSURE: 113 MMHG | RESPIRATION RATE: 17 BRPM | BODY MASS INDEX: 21.48 KG/M2 | HEIGHT: 63 IN | TEMPERATURE: 98 F

## 2022-07-21 DIAGNOSIS — F02.81 ALZHEIMER'S DEMENTIA WITH BEHAVIORAL DISTURBANCE, UNSPECIFIED TIMING OF DEMENTIA ONSET (HCC): Chronic | ICD-10-CM

## 2022-07-21 DIAGNOSIS — G30.9 ALZHEIMER'S DEMENTIA WITH BEHAVIORAL DISTURBANCE, UNSPECIFIED TIMING OF DEMENTIA ONSET (HCC): Chronic | ICD-10-CM
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
## 2022-07-21 DIAGNOSIS — S30.0XXA PELVIC CONTUSION, INITIAL ENCOUNTER: Primary | ICD-10-CM

## 2022-07-21 PROBLEM — S72.002A CLOSED FRACTURE OF NECK OF LEFT FEMUR (HCC): Status: ACTIVE | Noted: 2022-07-21

## 2022-07-21 LAB
ALBUMIN SERPL-MCNC: 3.3 G/DL (ref 3.4–5)
ALBUMIN/GLOB SERPL: 0.9 {RATIO} (ref 1–2)
ALP LIVER SERPL-CCNC: 110 U/L
ALT SERPL-CCNC: 15 U/L
ANION GAP SERPL CALC-SCNC: 5 MMOL/L (ref 0–18)
ANTIBODY SCREEN: NEGATIVE
AST SERPL-CCNC: 20 U/L (ref 15–37)
BASOPHILS # BLD AUTO: 0.07 X10(3) UL (ref 0–0.2)
BASOPHILS NFR BLD AUTO: 1 %
BILIRUB SERPL-MCNC: 0.3 MG/DL (ref 0.1–2)
BUN BLD-MCNC: 28 MG/DL (ref 7–18)
CALCIUM BLD-MCNC: 9.1 MG/DL (ref 8.5–10.1)
CHLORIDE SERPL-SCNC: 106 MMOL/L (ref 98–112)
CO2 SERPL-SCNC: 29 MMOL/L (ref 21–32)
CREAT BLD-MCNC: 0.78 MG/DL
EOSINOPHIL # BLD AUTO: 0.11 X10(3) UL (ref 0–0.7)
EOSINOPHIL NFR BLD AUTO: 1.6 %
ERYTHROCYTE [DISTWIDTH] IN BLOOD BY AUTOMATED COUNT: 13.2 %
GLOBULIN PLAS-MCNC: 3.7 G/DL (ref 2.8–4.4)
GLUCOSE BLD-MCNC: 96 MG/DL (ref 70–99)
HCT VFR BLD AUTO: 36.5 %
HGB BLD-MCNC: 12 G/DL
IMM GRANULOCYTES # BLD AUTO: 0.02 X10(3) UL (ref 0–1)
IMM GRANULOCYTES NFR BLD: 0.3 %
INR BLD: 1.12 (ref 0.8–1.2)
LYMPHOCYTES # BLD AUTO: 1.59 X10(3) UL (ref 1–4)
LYMPHOCYTES NFR BLD AUTO: 22.4 %
MCH RBC QN AUTO: 30.7 PG (ref 26–34)
MCHC RBC AUTO-ENTMCNC: 32.9 G/DL (ref 31–37)
MCV RBC AUTO: 93.4 FL
MONOCYTES # BLD AUTO: 0.62 X10(3) UL (ref 0.1–1)
MONOCYTES NFR BLD AUTO: 8.7 %
NEUTROPHILS # BLD AUTO: 4.68 X10 (3) UL (ref 1.5–7.7)
NEUTROPHILS # BLD AUTO: 4.68 X10(3) UL (ref 1.5–7.7)
NEUTROPHILS NFR BLD AUTO: 66 %
OSMOLALITY SERPL CALC.SUM OF ELEC: 295 MOSM/KG (ref 275–295)
PLATELET # BLD AUTO: 169 10(3)UL (ref 150–450)
POTASSIUM SERPL-SCNC: 4.2 MMOL/L (ref 3.5–5.1)
PROT SERPL-MCNC: 7 G/DL (ref 6.4–8.2)
PROTHROMBIN TIME: 14.4 SECONDS (ref 11.6–14.8)
RBC # BLD AUTO: 3.91 X10(6)UL
RH BLOOD TYPE: POSITIVE
SARS-COV-2 RNA RESP QL NAA+PROBE: NOT DETECTED
SODIUM SERPL-SCNC: 140 MMOL/L (ref 136–145)
WBC # BLD AUTO: 7.1 X10(3) UL (ref 4–11)

## 2022-07-21 PROCEDURE — 85025 COMPLETE CBC W/AUTO DIFF WBC: CPT | Performed by: EMERGENCY MEDICINE

## 2022-07-21 PROCEDURE — 80053 COMPREHEN METABOLIC PANEL: CPT | Performed by: EMERGENCY MEDICINE

## 2022-07-21 PROCEDURE — 99285 EMERGENCY DEPT VISIT HI MDM: CPT

## 2022-07-21 PROCEDURE — 86900 BLOOD TYPING SEROLOGIC ABO: CPT | Performed by: EMERGENCY MEDICINE

## 2022-07-21 PROCEDURE — 70450 CT HEAD/BRAIN W/O DYE: CPT | Performed by: EMERGENCY MEDICINE

## 2022-07-21 PROCEDURE — 36415 COLL VENOUS BLD VENIPUNCTURE: CPT

## 2022-07-21 PROCEDURE — 85610 PROTHROMBIN TIME: CPT | Performed by: EMERGENCY MEDICINE

## 2022-07-21 PROCEDURE — 86850 RBC ANTIBODY SCREEN: CPT | Performed by: EMERGENCY MEDICINE

## 2022-07-21 PROCEDURE — 93010 ELECTROCARDIOGRAM REPORT: CPT

## 2022-07-21 PROCEDURE — 93005 ELECTROCARDIOGRAM TRACING: CPT

## 2022-07-21 PROCEDURE — 86901 BLOOD TYPING SEROLOGIC RH(D): CPT | Performed by: EMERGENCY MEDICINE

## 2022-07-21 PROCEDURE — 72170 X-RAY EXAM OF PELVIS: CPT | Performed by: EMERGENCY MEDICINE

## 2022-07-21 PROCEDURE — 71045 X-RAY EXAM CHEST 1 VIEW: CPT | Performed by: EMERGENCY MEDICINE

## 2022-07-21 RX ORDER — MELATONIN
3 NIGHTLY PRN
Status: CANCELLED | OUTPATIENT
Start: 2022-07-21

## 2022-07-21 RX ORDER — ENOXAPARIN SODIUM 100 MG/ML
40 INJECTION SUBCUTANEOUS NIGHTLY
Status: CANCELLED | OUTPATIENT
Start: 2022-07-21

## 2022-07-21 RX ORDER — HYDROMORPHONE HYDROCHLORIDE 1 MG/ML
0.5 INJECTION, SOLUTION INTRAMUSCULAR; INTRAVENOUS; SUBCUTANEOUS EVERY 30 MIN PRN
OUTPATIENT
Start: 2022-07-21 | End: 2022-07-21

## 2022-07-21 RX ORDER — ACETAMINOPHEN 500 MG
1000 TABLET ORAL EVERY 8 HOURS PRN
Status: CANCELLED | OUTPATIENT
Start: 2022-07-21

## 2022-07-21 RX ORDER — SODIUM PHOSPHATE, DIBASIC AND SODIUM PHOSPHATE, MONOBASIC 7; 19 G/133ML; G/133ML
1 ENEMA RECTAL ONCE AS NEEDED
Status: CANCELLED | OUTPATIENT
Start: 2022-07-21

## 2022-07-21 RX ORDER — SODIUM CHLORIDE 9 MG/ML
10 INJECTION INTRAVENOUS AS NEEDED
Status: DISCONTINUED | OUTPATIENT
Start: 2022-07-21 | End: 2022-07-21

## 2022-07-21 RX ORDER — BISACODYL 10 MG
10 SUPPOSITORY, RECTAL RECTAL
Status: CANCELLED | OUTPATIENT
Start: 2022-07-21

## 2022-07-21 RX ORDER — HYDROCODONE BITARTRATE AND ACETAMINOPHEN 5; 325 MG/1; MG/1
1 TABLET ORAL EVERY 4 HOURS PRN
Status: CANCELLED | OUTPATIENT
Start: 2022-07-21

## 2022-07-21 RX ORDER — MORPHINE SULFATE 2 MG/ML
1 INJECTION, SOLUTION INTRAMUSCULAR; INTRAVENOUS EVERY 2 HOUR PRN
Status: CANCELLED | OUTPATIENT
Start: 2022-07-21

## 2022-07-21 RX ORDER — SENNOSIDES 8.6 MG
17.2 TABLET ORAL NIGHTLY PRN
Status: CANCELLED | OUTPATIENT
Start: 2022-07-21

## 2022-07-21 RX ORDER — MORPHINE SULFATE 2 MG/ML
2 INJECTION, SOLUTION INTRAMUSCULAR; INTRAVENOUS EVERY 2 HOUR PRN
Status: CANCELLED | OUTPATIENT
Start: 2022-07-21

## 2022-07-21 RX ORDER — SODIUM CHLORIDE 9 MG/ML
INJECTION, SOLUTION INTRAVENOUS ONCE
Status: DISCONTINUED | OUTPATIENT
Start: 2022-07-21 | End: 2022-07-21

## 2022-07-21 RX ORDER — ROPIVACAINE HYDROCHLORIDE 5 MG/ML
30 INJECTION, SOLUTION EPIDURAL; INFILTRATION; PERINEURAL AS NEEDED
Status: DISCONTINUED | OUTPATIENT
Start: 2022-07-21 | End: 2022-07-21

## 2022-07-21 RX ORDER — POLYETHYLENE GLYCOL 3350 17 G/17G
17 POWDER, FOR SOLUTION ORAL DAILY PRN
Status: CANCELLED | OUTPATIENT
Start: 2022-07-21

## 2022-07-21 RX ORDER — LIDOCAINE HYDROCHLORIDE 10 MG/ML
2 INJECTION, SOLUTION EPIDURAL; INFILTRATION; INTRACAUDAL; PERINEURAL AS NEEDED
Status: DISCONTINUED | OUTPATIENT
Start: 2022-07-21 | End: 2022-07-21

## 2022-07-21 RX ORDER — SODIUM CHLORIDE 9 MG/ML
INJECTION, SOLUTION INTRAVENOUS CONTINUOUS
OUTPATIENT
Start: 2022-07-21 | End: 2022-07-21

## 2022-07-21 RX ORDER — ONDANSETRON 2 MG/ML
4 INJECTION INTRAMUSCULAR; INTRAVENOUS EVERY 6 HOURS PRN
Status: CANCELLED | OUTPATIENT
Start: 2022-07-21

## 2022-07-21 RX ORDER — METOCLOPRAMIDE HYDROCHLORIDE 5 MG/ML
10 INJECTION INTRAMUSCULAR; INTRAVENOUS EVERY 8 HOURS PRN
Status: CANCELLED | OUTPATIENT
Start: 2022-07-21

## 2022-07-21 RX ORDER — HYDROCODONE BITARTRATE AND ACETAMINOPHEN 5; 325 MG/1; MG/1
2 TABLET ORAL EVERY 4 HOURS PRN
Status: CANCELLED | OUTPATIENT
Start: 2022-07-21

## 2022-07-21 RX ORDER — DONEPEZIL HYDROCHLORIDE 5 MG/1
5 TABLET, FILM COATED ORAL NIGHTLY
Status: CANCELLED | OUTPATIENT
Start: 2022-07-21

## 2022-07-21 RX ORDER — ONDANSETRON 2 MG/ML
4 INJECTION INTRAMUSCULAR; INTRAVENOUS EVERY 4 HOURS PRN
OUTPATIENT
Start: 2022-07-21 | End: 2022-07-21

## 2022-07-21 NOTE — ED INITIAL ASSESSMENT (HPI)
Staff at [facility] called ems for witnessed fall pta  No loc  No bld thinners   A/o x one per nh = wnl  Ems report deform to left hip

## 2022-07-23 LAB
ATRIAL RATE: 58 BPM
P AXIS: -11 DEGREES
P-R INTERVAL: 176 MS
Q-T INTERVAL: 426 MS
QRS DURATION: 76 MS
QTC CALCULATION (BEZET): 418 MS
R AXIS: -38 DEGREES
T AXIS: 30 DEGREES
VENTRICULAR RATE: 58 BPM

## 2022-08-12 ENCOUNTER — HOSPITAL ENCOUNTER (EMERGENCY)
Facility: HOSPITAL | Age: 82
Discharge: HOME | End: 2022-08-12
Attending: EMERGENCY MEDICINE
Payer: MEDICARE

## 2022-08-12 ENCOUNTER — APPOINTMENT (OUTPATIENT)
Dept: CT IMAGING | Facility: HOSPITAL | Age: 82
End: 2022-08-12
Attending: EMERGENCY MEDICINE
Payer: MEDICARE

## 2022-08-12 VITALS
DIASTOLIC BLOOD PRESSURE: 66 MMHG | OXYGEN SATURATION: 100 % | RESPIRATION RATE: 17 BRPM | SYSTOLIC BLOOD PRESSURE: 122 MMHG | HEART RATE: 54 BPM

## 2022-08-12 DIAGNOSIS — S00.33XA CONTUSION OF NOSE, INITIAL ENCOUNTER: ICD-10-CM

## 2022-08-12 DIAGNOSIS — S00.81XA ABRASION OF FACE, INITIAL ENCOUNTER: ICD-10-CM

## 2022-08-12 DIAGNOSIS — S09.90XA CLOSED HEAD INJURY, INITIAL ENCOUNTER: Primary | ICD-10-CM

## 2022-08-12 PROCEDURE — 99284 EMERGENCY DEPT VISIT MOD MDM: CPT

## 2022-08-12 PROCEDURE — 72125 CT NECK SPINE W/O DYE: CPT | Performed by: EMERGENCY MEDICINE

## 2022-08-12 PROCEDURE — 70450 CT HEAD/BRAIN W/O DYE: CPT | Performed by: EMERGENCY MEDICINE

## 2022-08-12 NOTE — ED QUICK NOTES
Pt presents to ED c/o fall from [facility]. No LOC, no blood thinners. Pt arrived with c-collar. Hx dementia. Per medics unable to respond to questions. Dry blood noted on teeth. No active bleeding noted.

## 2022-11-02 ENCOUNTER — HOSPITAL ENCOUNTER (EMERGENCY)
Facility: HOSPITAL | Age: 82
Discharge: HOME | End: 2022-11-02
Attending: EMERGENCY MEDICINE
Payer: MEDICARE

## 2022-11-02 ENCOUNTER — APPOINTMENT (OUTPATIENT)
Dept: GENERAL RADIOLOGY | Facility: HOSPITAL | Age: 82
End: 2022-11-02
Attending: EMERGENCY MEDICINE
Payer: MEDICARE

## 2022-11-02 VITALS
HEART RATE: 64 BPM | BODY MASS INDEX: 23.81 KG/M2 | DIASTOLIC BLOOD PRESSURE: 75 MMHG | HEIGHT: 60 IN | SYSTOLIC BLOOD PRESSURE: 140 MMHG | OXYGEN SATURATION: 97 % | RESPIRATION RATE: 20 BRPM | TEMPERATURE: 98 F | WEIGHT: 121.25 LBS

## 2022-11-02 DIAGNOSIS — S60.221A CONTUSION OF RIGHT HAND, INITIAL ENCOUNTER: Primary | ICD-10-CM

## 2022-11-02 PROCEDURE — 73130 X-RAY EXAM OF HAND: CPT | Performed by: EMERGENCY MEDICINE

## 2022-11-02 PROCEDURE — 99283 EMERGENCY DEPT VISIT LOW MDM: CPT

## 2022-11-02 RX ORDER — GABAPENTIN 100 MG/1
100 CAPSULE ORAL 2 TIMES DAILY
COMMUNITY

## 2022-11-02 RX ORDER — ACETAMINOPHEN 500 MG
1000 TABLET ORAL ONCE
Status: DISCONTINUED | OUTPATIENT
Start: 2022-11-02 | End: 2022-11-03

## 2022-11-03 NOTE — DISCHARGE INSTRUCTIONS
Take Tylenol, ice the area follow-up with your primary MD for continued symptoms follow-up with your family for orthopedic referral.

## 2022-11-15 ENCOUNTER — HOSPITAL ENCOUNTER (EMERGENCY)
Facility: HOSPITAL | Age: 82
Discharge: HOME | End: 2022-11-16
Attending: EMERGENCY MEDICINE
Payer: MEDICARE

## 2022-11-15 ENCOUNTER — APPOINTMENT (OUTPATIENT)
Dept: CT IMAGING | Facility: HOSPITAL | Age: 82
End: 2022-11-15
Attending: EMERGENCY MEDICINE
Payer: MEDICARE

## 2022-11-15 DIAGNOSIS — S00.03XA CONTUSION OF SCALP, INITIAL ENCOUNTER: ICD-10-CM

## 2022-11-15 DIAGNOSIS — N39.0 URINARY TRACT INFECTION WITHOUT HEMATURIA, SITE UNSPECIFIED: Primary | ICD-10-CM

## 2022-11-15 LAB
BASOPHILS # BLD AUTO: 0.07 X10(3) UL (ref 0–0.2)
BASOPHILS NFR BLD AUTO: 0.8 %
EOSINOPHIL # BLD AUTO: 0.17 X10(3) UL (ref 0–0.7)
EOSINOPHIL NFR BLD AUTO: 1.8 %
ERYTHROCYTE [DISTWIDTH] IN BLOOD BY AUTOMATED COUNT: 13.1 %
HCT VFR BLD AUTO: 40 %
HGB BLD-MCNC: 13 G/DL
IMM GRANULOCYTES # BLD AUTO: 0.02 X10(3) UL (ref 0–1)
IMM GRANULOCYTES NFR BLD: 0.2 %
LYMPHOCYTES # BLD AUTO: 2.1 X10(3) UL (ref 1–4)
LYMPHOCYTES NFR BLD AUTO: 22.6 %
MCH RBC QN AUTO: 31.2 PG (ref 26–34)
MCHC RBC AUTO-ENTMCNC: 32.5 G/DL (ref 31–37)
MCV RBC AUTO: 95.9 FL
MONOCYTES # BLD AUTO: 0.68 X10(3) UL (ref 0.1–1)
MONOCYTES NFR BLD AUTO: 7.3 %
NEUTROPHILS # BLD AUTO: 6.24 X10 (3) UL (ref 1.5–7.7)
NEUTROPHILS # BLD AUTO: 6.24 X10(3) UL (ref 1.5–7.7)
NEUTROPHILS NFR BLD AUTO: 67.3 %
PLATELET # BLD AUTO: 169 10(3)UL (ref 150–450)
RBC # BLD AUTO: 4.17 X10(6)UL
WBC # BLD AUTO: 9.3 X10(3) UL (ref 4–11)

## 2022-11-15 PROCEDURE — 99285 EMERGENCY DEPT VISIT HI MDM: CPT | Performed by: EMERGENCY MEDICINE

## 2022-11-15 PROCEDURE — 85025 COMPLETE CBC W/AUTO DIFF WBC: CPT | Performed by: EMERGENCY MEDICINE

## 2022-11-15 PROCEDURE — 99284 EMERGENCY DEPT VISIT MOD MDM: CPT | Performed by: EMERGENCY MEDICINE

## 2022-11-15 PROCEDURE — 70450 CT HEAD/BRAIN W/O DYE: CPT | Performed by: EMERGENCY MEDICINE

## 2022-11-15 PROCEDURE — 80053 COMPREHEN METABOLIC PANEL: CPT | Performed by: EMERGENCY MEDICINE

## 2022-11-15 PROCEDURE — 72125 CT NECK SPINE W/O DYE: CPT | Performed by: EMERGENCY MEDICINE

## 2022-11-16 VITALS
SYSTOLIC BLOOD PRESSURE: 111 MMHG | RESPIRATION RATE: 20 BRPM | OXYGEN SATURATION: 100 % | BODY MASS INDEX: 23.81 KG/M2 | TEMPERATURE: 98 F | HEIGHT: 60 IN | WEIGHT: 121.25 LBS | DIASTOLIC BLOOD PRESSURE: 62 MMHG | HEART RATE: 64 BPM

## 2022-11-16 LAB
ALBUMIN SERPL-MCNC: 3.5 G/DL (ref 3.4–5)
ALBUMIN/GLOB SERPL: 1 {RATIO} (ref 1–2)
ALP LIVER SERPL-CCNC: 87 U/L
ALT SERPL-CCNC: 14 U/L
ANION GAP SERPL CALC-SCNC: 1 MMOL/L (ref 0–18)
AST SERPL-CCNC: 15 U/L (ref 15–37)
BILIRUB SERPL-MCNC: 0.2 MG/DL (ref 0.1–2)
BILIRUB UR QL STRIP.AUTO: NEGATIVE
BUN BLD-MCNC: 30 MG/DL (ref 7–18)
CALCIUM BLD-MCNC: 9.4 MG/DL (ref 8.5–10.1)
CHLORIDE SERPL-SCNC: 110 MMOL/L (ref 98–112)
CO2 SERPL-SCNC: 32 MMOL/L (ref 21–32)
COLOR UR AUTO: YELLOW
CREAT BLD-MCNC: 1.07 MG/DL
GFR SERPLBLD BASED ON 1.73 SQ M-ARVRAT: 53 ML/MIN/1.73M2 (ref 60–?)
GLOBULIN PLAS-MCNC: 3.5 G/DL (ref 2.8–4.4)
GLUCOSE BLD-MCNC: 129 MG/DL (ref 70–99)
GLUCOSE UR STRIP.AUTO-MCNC: NEGATIVE MG/DL
KETONES UR STRIP.AUTO-MCNC: NEGATIVE MG/DL
NITRITE UR QL STRIP.AUTO: NEGATIVE
OSMOLALITY SERPL CALC.SUM OF ELEC: 304 MOSM/KG (ref 275–295)
PH UR STRIP.AUTO: 5 [PH] (ref 5–8)
POTASSIUM SERPL-SCNC: 4.4 MMOL/L (ref 3.5–5.1)
PROT SERPL-MCNC: 7 G/DL (ref 6.4–8.2)
PROT UR STRIP.AUTO-MCNC: NEGATIVE MG/DL
RBC UR QL AUTO: NEGATIVE
SODIUM SERPL-SCNC: 143 MMOL/L (ref 136–145)
SP GR UR STRIP.AUTO: 1.03 (ref 1–1.03)
UROBILINOGEN UR STRIP.AUTO-MCNC: 2 MG/DL
WBC #/AREA URNS AUTO: >50 /HPF

## 2022-11-16 PROCEDURE — 87086 URINE CULTURE/COLONY COUNT: CPT | Performed by: EMERGENCY MEDICINE

## 2022-11-16 PROCEDURE — 81001 URINALYSIS AUTO W/SCOPE: CPT | Performed by: EMERGENCY MEDICINE

## 2022-11-16 PROCEDURE — 96365 THER/PROPH/DIAG IV INF INIT: CPT | Performed by: EMERGENCY MEDICINE

## 2022-11-16 RX ORDER — CEPHALEXIN 500 MG/1
500 CAPSULE ORAL 3 TIMES DAILY
Qty: 21 CAPSULE | Refills: 0 | Status: SHIPPED | OUTPATIENT
Start: 2022-11-16 | End: 2022-11-23

## 2022-11-16 NOTE — DISCHARGE INSTRUCTIONS
Follow-up with your primary care doctor within the next week for reassessment. Take the antibiotic as prescribed. Return for any concerning symptoms.

## 2022-11-16 NOTE — ED INITIAL ASSESSMENT (HPI)
Pt from the [facility], unwitnessed fall this evening, laceration to the back of her head, c-collar in place by EMS.

## 2022-11-29 ENCOUNTER — APPOINTMENT (OUTPATIENT)
Dept: CT IMAGING | Facility: HOSPITAL | Age: 82
End: 2022-11-29
Attending: EMERGENCY MEDICINE
Payer: MEDICARE

## 2022-11-29 ENCOUNTER — HOSPITAL ENCOUNTER (EMERGENCY)
Facility: HOSPITAL | Age: 82
Discharge: HOME | End: 2022-11-29
Attending: EMERGENCY MEDICINE
Payer: MEDICARE

## 2022-11-29 VITALS
DIASTOLIC BLOOD PRESSURE: 65 MMHG | TEMPERATURE: 99 F | SYSTOLIC BLOOD PRESSURE: 112 MMHG | HEART RATE: 62 BPM | RESPIRATION RATE: 18 BRPM | OXYGEN SATURATION: 100 % | WEIGHT: 121.25 LBS | BODY MASS INDEX: 24 KG/M2
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
## 2022-11-29 PROCEDURE — 99284 EMERGENCY DEPT VISIT MOD MDM: CPT

## 2022-11-29 PROCEDURE — 70450 CT HEAD/BRAIN W/O DYE: CPT | Performed by: EMERGENCY MEDICINE

## 2022-11-29 PROCEDURE — 72125 CT NECK SPINE W/O DYE: CPT | Performed by: EMERGENCY MEDICINE

## 2022-11-29 NOTE — ED INITIAL ASSESSMENT (HPI)
Pt from the [address] with unwitnessed fall, pt c/o pain to head.

## 2022-12-02 ENCOUNTER — APPOINTMENT (OUTPATIENT)
Dept: CT IMAGING | Facility: HOSPITAL | Age: 82
End: 2022-12-02
Attending: EMERGENCY MEDICINE
Payer: MEDICARE

## 2022-12-02 ENCOUNTER — APPOINTMENT (OUTPATIENT)
Dept: GENERAL RADIOLOGY | Facility: HOSPITAL | Age: 82
End: 2022-12-02
Attending: EMERGENCY MEDICINE
Payer: MEDICARE

## 2022-12-02 ENCOUNTER — HOSPITAL ENCOUNTER (EMERGENCY)
Facility: HOSPITAL | Age: 82
Discharge: HOME | End: 2022-12-02
Attending: EMERGENCY MEDICINE
Payer: MEDICARE

## 2022-12-02 VITALS
RESPIRATION RATE: 18 BRPM | SYSTOLIC BLOOD PRESSURE: 122 MMHG | OXYGEN SATURATION: 96 % | BODY MASS INDEX: 29 KG/M2 | HEART RATE: 61 BPM | DIASTOLIC BLOOD PRESSURE: 56 MMHG | TEMPERATURE: 98 F | WEIGHT: 150 LBS

## 2022-12-02 DIAGNOSIS — S09.90XA INJURY OF HEAD, INITIAL ENCOUNTER: ICD-10-CM

## 2022-12-02 DIAGNOSIS — S02.2XXA CLOSED FRACTURE OF NASAL BONE, INITIAL ENCOUNTER: ICD-10-CM

## 2022-12-02 DIAGNOSIS — S00.31XA ABRASION OF NOSE, INITIAL ENCOUNTER: ICD-10-CM
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
## 2022-12-02 LAB
ALBUMIN SERPL-MCNC: 3.6 G/DL (ref 3.4–5)
ALBUMIN/GLOB SERPL: 1 {RATIO} (ref 1–2)
ALP LIVER SERPL-CCNC: 96 U/L
ALT SERPL-CCNC: 16 U/L
ANION GAP SERPL CALC-SCNC: 3 MMOL/L (ref 0–18)
AST SERPL-CCNC: 17 U/L (ref 15–37)
BASOPHILS # BLD AUTO: 0.06 X10(3) UL (ref 0–0.2)
BASOPHILS NFR BLD AUTO: 0.7 %
BILIRUB SERPL-MCNC: 0.3 MG/DL (ref 0.1–2)
BUN BLD-MCNC: 28 MG/DL (ref 7–18)
CALCIUM BLD-MCNC: 9.6 MG/DL (ref 8.5–10.1)
CHLORIDE SERPL-SCNC: 109 MMOL/L (ref 98–112)
CO2 SERPL-SCNC: 29 MMOL/L (ref 21–32)
CREAT BLD-MCNC: 0.9 MG/DL
EOSINOPHIL # BLD AUTO: 0.16 X10(3) UL (ref 0–0.7)
EOSINOPHIL NFR BLD AUTO: 1.8 %
ERYTHROCYTE [DISTWIDTH] IN BLOOD BY AUTOMATED COUNT: 13.3 %
GFR SERPLBLD BASED ON 1.73 SQ M-ARVRAT: 65 ML/MIN/1.73M2 (ref 60–?)
GLOBULIN PLAS-MCNC: 3.6 G/DL (ref 2.8–4.4)
GLUCOSE BLD-MCNC: 104 MG/DL (ref 70–99)
HCT VFR BLD AUTO: 38.3 %
HGB BLD-MCNC: 12.6 G/DL
IMM GRANULOCYTES # BLD AUTO: 0.02 X10(3) UL (ref 0–1)
IMM GRANULOCYTES NFR BLD: 0.2 %
LYMPHOCYTES # BLD AUTO: 1.88 X10(3) UL (ref 1–4)
LYMPHOCYTES NFR BLD AUTO: 20.8 %
MCH RBC QN AUTO: 31 PG (ref 26–34)
MCHC RBC AUTO-ENTMCNC: 32.9 G/DL (ref 31–37)
MCV RBC AUTO: 94.3 FL
MONOCYTES # BLD AUTO: 0.72 X10(3) UL (ref 0.1–1)
MONOCYTES NFR BLD AUTO: 8 %
NEUTROPHILS # BLD AUTO: 6.18 X10 (3) UL (ref 1.5–7.7)
NEUTROPHILS # BLD AUTO: 6.18 X10(3) UL (ref 1.5–7.7)
NEUTROPHILS NFR BLD AUTO: 68.5 %
OSMOLALITY SERPL CALC.SUM OF ELEC: 298 MOSM/KG (ref 275–295)
PLATELET # BLD AUTO: 185 10(3)UL (ref 150–450)
POTASSIUM SERPL-SCNC: 4.8 MMOL/L (ref 3.5–5.1)
PROT SERPL-MCNC: 7.2 G/DL (ref 6.4–8.2)
RBC # BLD AUTO: 4.06 X10(6)UL
SODIUM SERPL-SCNC: 141 MMOL/L (ref 136–145)
WBC # BLD AUTO: 9 X10(3) UL (ref 4–11)

## 2022-12-02 PROCEDURE — 80053 COMPREHEN METABOLIC PANEL: CPT | Performed by: EMERGENCY MEDICINE

## 2022-12-02 PROCEDURE — 70160 X-RAY EXAM OF NASAL BONES: CPT | Performed by: EMERGENCY MEDICINE

## 2022-12-02 PROCEDURE — 85025 COMPLETE CBC W/AUTO DIFF WBC: CPT | Performed by: EMERGENCY MEDICINE

## 2022-12-02 PROCEDURE — 70450 CT HEAD/BRAIN W/O DYE: CPT | Performed by: EMERGENCY MEDICINE

## 2022-12-02 PROCEDURE — 36415 COLL VENOUS BLD VENIPUNCTURE: CPT

## 2022-12-02 PROCEDURE — 72125 CT NECK SPINE W/O DYE: CPT | Performed by: EMERGENCY MEDICINE

## 2022-12-02 PROCEDURE — 99284 EMERGENCY DEPT VISIT MOD MDM: CPT

## 2022-12-02 PROCEDURE — 93005 ELECTROCARDIOGRAM TRACING: CPT

## 2022-12-03 LAB
ATRIAL RATE: 59 BPM
P AXIS: 46 DEGREES
P-R INTERVAL: 198 MS
Q-T INTERVAL: 414 MS
QRS DURATION: 86 MS
QTC CALCULATION (BEZET): 409 MS
R AXIS: -43 DEGREES
T AXIS: 49 DEGREES
VENTRICULAR RATE: 59 BPM

## 2022-12-03 NOTE — ED QUICK NOTES
Pt ambulated with walker with a steady gait. Required frequent redirection. MD instructed pt be discharged with a walker. Spoke to daughter Manuel Brewster who was updated on POC.

## 2022-12-03 NOTE — ED INITIAL ASSESSMENT (HPI)
Arrives by EMS from [facility] after an unwitnessed fall in her room. History of alzheimers A&Ox1-2. No change in mental status. No blood thinners. Arrives with abrasion on nose.

## 2022-12-03 NOTE — DISCHARGE INSTRUCTIONS
Follow-up with your primary care physician for ENT consultation return if increasing pain discomfort. You were seen in the emergency room in a limited time. There is a possibility that although we do not see any acute process at this present time that things can change with time. Is therefore imperative that you follow-up with primary care physician for close follow-up. If there is any significant progression of your pain  or other symptoms you to return immediately to the emergency room.

## 2022-12-03 NOTE — ED NOTES
The patient's comprehensive shows a BUN of 28 but when looking through her old record she is she runs in that level normally. She does not look severely dehydrated.

## 2022-12-26 ENCOUNTER — HOSPITAL ENCOUNTER (EMERGENCY)
Facility: HOSPITAL | Age: 82
Discharge: HOME | End: 2022-12-26
Attending: EMERGENCY MEDICINE
Payer: MEDICARE

## 2022-12-26 ENCOUNTER — APPOINTMENT (OUTPATIENT)
Dept: CT IMAGING | Facility: HOSPITAL | Age: 82
End: 2022-12-26
Attending: EMERGENCY MEDICINE
Payer: MEDICARE

## 2022-12-26 VITALS
DIASTOLIC BLOOD PRESSURE: 67 MMHG | OXYGEN SATURATION: 97 % | HEART RATE: 67 BPM | TEMPERATURE: 97 F | RESPIRATION RATE: 18 BRPM | SYSTOLIC BLOOD PRESSURE: 116 MMHG

## 2022-12-26 DIAGNOSIS — S01.01XA SCALP LACERATION, INITIAL ENCOUNTER: Primary | ICD-10-CM

## 2022-12-26 PROCEDURE — 72125 CT NECK SPINE W/O DYE: CPT | Performed by: EMERGENCY MEDICINE

## 2022-12-26 PROCEDURE — 99283 EMERGENCY DEPT VISIT LOW MDM: CPT

## 2022-12-26 PROCEDURE — 12001 RPR S/N/AX/GEN/TRNK 2.5CM/<: CPT

## 2022-12-26 PROCEDURE — 70450 CT HEAD/BRAIN W/O DYE: CPT | Performed by: EMERGENCY MEDICINE

## 2022-12-26 PROCEDURE — 99282 EMERGENCY DEPT VISIT SF MDM: CPT

## 2022-12-27 NOTE — ED INITIAL ASSESSMENT (HPI)
Pt to ED via EMS from The [address] [place] [address] for a witnessed mechanical fall. Per EMS staff stated pt was walking and \"lost her footing\", pt hit her head and sustained a small lac to the back of her head. Pt is Aox1 at baseline. No LOC, no blood thinners. Gauze wrap in place on patients head, bleeding controlled.

## 2023-03-21 NOTE — ED AVS SNAPSHOT
[facility]  [place] [address]  Phone:  [phone]  Fax:  [phone]          Fanny Roddy   MRN: [id number]    Department:  [facility]   Date of Visit:  7/9/2020 and Class Registration line at [phone] or find a doctor online by visiting [facility].     We recommend that you schedule follow up care with a primary care provider within the next three months to obtain basic health screening including reasse Resulted

## 2024-02-08 ENCOUNTER — EXTERNAL RECORD (OUTPATIENT)
Dept: OTHER | Age: 84
End: 2024-02-08

## 2024-02-08 ENCOUNTER — EXTERNAL RECORD (OUTPATIENT)
Dept: OTOLARYNGOLOGY | Age: 84
End: 2024-02-08

## 2024-02-08 LAB
APPEARANCE: ABNORMAL
BACTERIA: ABNORMAL /HPF
BILIRUBIN: ABNORMAL
CALCIUM OXALATE CRYSTALS: ABNORMAL /HPF
COLOR: YELLOW
CULTURE REFLEX COMMENT: YES
GLUCOSE, URINE, AUTOMATED: ABNORMAL MG/DL
KETONES, URINE, AUTOMATED: 20 MG/DL
LACTIC ACID: 1.2 MMOL/L (ref 0.5–2)
LEUKOCYTE, URINE, AUTOMATED: ABNORMAL
NITRITE, URINE AUTOMATED: NEGATIVE
PH, URINE: 5 (ref 5–8)
PROTEIN, URINE: 100 MG/DL
RBC: ABNORMAL /HPF (ref 0–2)
SPECIFIC GRAVITY UA: 1.02 (ref 1–1.03)
SQUAMOUS EPITHELIAL: ABNORMAL /LPF
TRANSITIONAL CELLS: ABNORMAL /HPF
URINE, BLOOD, AUTOMATED: ABNORMAL
UROBILINOGEN, URINE, AUTOMATED: ABNORMAL MG/DL
WBC: ABNORMAL /HPF (ref 0–5)

## 2024-02-09 LAB
*MEAN CORPUSCULAR HGB CONC: 31.7 G/DL (ref 32.5–35.8)
A/G RATIO: 1 (ref 1.1–2.4)
ALANINE AMINOTRANSFE: 6 U/L (ref 7–52)
ALBUMIN, SERUM (ALB): 3.4 G/DL (ref 3.5–5.7)
ALKALINE PHOSPHATASE (ALK): 46 U/L (ref 34–104)
ANION GAP: 10 MEQ/L (ref 6.2–14.7)
ANION GAP: 9 MEQ/L (ref 6.2–14.7)
ASPARTATE AMINOTRANS: 14 U/L (ref 13–39)
BASOPHIL AUTOMATED: 0.1 %
BASOPHILS: 0 (ref 0–0.14)
BILIRUBIN, TOTAL: 0.6 MG/DL (ref 0.3–1)
BLOOD UREA NITROGEN (BUN): 30 MG/DL (ref 7–25)
BLOOD UREA NITROGEN (BUN): 36 MG/DL (ref 7–25)
BUN/CREATININE RATIO: 41.7 (ref 7.4–23)
BUN/CREATININE RATIO: 50 (ref 7.4–23)
CALCIUM, SERUM: 9.2 MG/DL (ref 8.6–10.3)
CALCIUM, SERUM: 9.3 MG/DL (ref 8.6–10.3)
CARBON DIOXIDE: 23 MEQ/L (ref 21–31)
CARBON DIOXIDE: 24 MEQ/L (ref 21–31)
CHLORIDE, SERUM: 114 MMOL/L (ref 98–107)
CHLORIDE, SERUM: 115 MMOL/L (ref 98–107)
CREATININE: 0.72 MG/DL (ref 0.6–1.2)
CREATININE: 0.72 MG/DL (ref 0.6–1.2)
EOSINOPHIL AUTOMATED: 0.1 %
EOSINOPHILS: 0 (ref 0–0.6)
EST GLOMERULAR FILTRATION RATE: > 60 ML/MIN
EST GLOMERULAR FILTRATION RATE: > 60 ML/MIN
GLUCOSE: 151 MG/DL (ref 70–99)
GLUCOSE: 175 MG/DL (ref 70–99)
HEMATOCRIT: 38.4 % (ref 34.7–45.1)
HEMOGLOBIN: 12.2 GM/DL (ref 12–15.3)
K (POTASSIUM, SERUM): 3.4 MMOL/L (ref 3.5–5.2)
K (POTASSIUM, SERUM): 4.1 MMOL/L (ref 3.5–5.2)
LYMPHOCYTE AUTOMATED: 3.4 %
LYMPHOCYTES: 0.9 (ref 0.78–3.73)
MEAN CORPUSCULAR HGB: 29.2 PG (ref 26–34)
MEAN CORPUSCULAR VOL: 91.9 FL (ref 80–100)
MEAN PLATELET VOLUME: 8.4 FL (ref 6.8–10.2)
MG (MAGNESIUM, SERUM: 2 MG/DL (ref 1.6–2.6)
MONOCYTE AUTOMATED: 2 %
MONOCYTES: 0.5 (ref 0.17–1)
MRSA SCREEN, PCR: DETECTED
MRSA SOURCE: ABNORMAL
NA (SODIUM, SERUM): 147 MMOL/L (ref 133–144)
NA (SODIUM, SERUM): 148 MMOL/L (ref 133–144)
NEUTROPHIL ABSOLUTE: 24.5 (ref 1.91–7.6)
NEUTROPHIL AUTOMATED: 94.4 %
PLATELET COUNT: 185 K/MM3 (ref 150–450)
PROTEIN TOTAL: 6.8 G/DL (ref 6.4–8.9)
RED BLOOD CELL COUNT: 4.18 M/MM3 (ref 3.63–5.04)
RED CELL DISTRIBUTIO: 15 % (ref 11.9–15.9)
URINE CULTURE: NORMAL
WHITE BLOOD CELL COU: 25.9 K/MM3 (ref 4–11)
WHOLE BLOOD LACTATE: 0.9 (ref 0.5–2)

## 2024-02-10 ENCOUNTER — EXTERNAL RECORD (OUTPATIENT)
Dept: CARDIOLOGY | Age: 84
End: 2024-02-10

## 2024-02-10 LAB
*MEAN CORPUSCULAR HGB CONC: 31.5 G/DL (ref 32.5–35.8)
ANION GAP: 7 MEQ/L (ref 6.2–14.7)
BASOPHIL AUTOMATED: 0.1 %
BASOPHILS: 0 (ref 0–0.14)
BLOOD UREA NITROGEN (BUN): 29 MG/DL (ref 7–25)
BUN/CREATININE RATIO: 44.6 (ref 7.4–23)
CALCIUM, SERUM: 8.6 MG/DL (ref 8.6–10.3)
CARBON DIOXIDE: 22 MEQ/L (ref 21–31)
CHLORIDE, SERUM: 117 MMOL/L (ref 98–107)
CREATININE: 0.65 MG/DL (ref 0.6–1.2)
EOSINOPHIL AUTOMATED: 0.4 %
EOSINOPHILS: 0.1 (ref 0–0.6)
EST GLOMERULAR FILTRATION RATE: > 60 ML/MIN
GLUCOSE: 85 MG/DL (ref 70–99)
GRAM STAIN: NORMAL
HEMATOCRIT: 35.6 % (ref 34.7–45.1)
HEMOGLOBIN: 11.2 GM/DL (ref 12–15.3)
K (POTASSIUM, SERUM): 3.4 MMOL/L (ref 3.5–5.2)
LYMPHOCYTE AUTOMATED: 7.1 %
LYMPHOCYTES: 1.7 (ref 0.78–3.73)
MEAN CORPUSCULAR HGB: 29.3 PG (ref 26–34)
MEAN CORPUSCULAR VOL: 93.1 FL (ref 80–100)
MEAN PLATELET VOLUME: 8.4 FL (ref 6.8–10.2)
MG (MAGNESIUM, SERUM: 1.9 MG/DL (ref 1.6–2.6)
MONOCYTE AUTOMATED: 4.3 %
MONOCYTES: 1 (ref 0.17–1)
NA (SODIUM, SERUM): 146 MMOL/L (ref 133–144)
NEUTROPHIL ABSOLUTE: 20.6 (ref 1.91–7.6)
NEUTROPHIL AUTOMATED: 88.1 %
PLATELET COUNT: 168 K/MM3 (ref 150–450)
RED BLOOD CELL COUNT: 3.82 M/MM3 (ref 3.63–5.04)
RED CELL DISTRIBUTIO: 15.4 % (ref 11.9–15.9)
TROPONIN I HIGH SENSITIVITY: 10 PG/ML (ref 0–12)
VANCOMYCIN TROUGH LD DATE: ABNORMAL
VANCOMYCIN TROUGH LD TIME: 1743
VANCOMYCIN TROUGH: 8.2 UG/ML (ref 10–20)
WHITE BLOOD CELL COU: 23.3 K/MM3 (ref 4–11)

## 2024-02-11 LAB
*MEAN CORPUSCULAR HGB CONC: 33 G/DL (ref 32.5–35.8)
ALBUMIN, SERUM (ALB): 2.8 G/DL (ref 3.5–5.7)
ANION GAP: 7 MEQ/L (ref 6.2–14.7)
BASOPHIL AUTOMATED: 0.3 %
BASOPHILS: 0 (ref 0–0.14)
BLOOD UREA NITROGEN (BUN): 15 MG/DL (ref 7–25)
BUN/CREATININE RATIO: 25.9 (ref 7.4–23)
CALCIUM ALBUMIN ADJUSTED: 9.5 MG/DL (ref 8.6–10.3)
CALCIUM, SERUM: 8.5 MG/DL (ref 8.6–10.3)
CARBON DIOXIDE: 22 MEQ/L (ref 21–31)
CHLORIDE, SERUM: 110 MMOL/L (ref 98–107)
CREATININE: 0.58 MG/DL (ref 0.6–1.2)
EOSINOPHIL AUTOMATED: 1.6 %
EOSINOPHILS: 0.2 (ref 0–0.6)
EST GLOMERULAR FILTRATION RATE: > 60 ML/MIN
GLUCOSE: 124 MG/DL (ref 70–99)
HEMATOCRIT: 34.8 % (ref 34.7–45.1)
HEMOGLOBIN: 11.5 GM/DL (ref 12–15.3)
K (POTASSIUM, SERUM): 3.3 MMOL/L (ref 3.5–5.2)
LYMPHOCYTE AUTOMATED: 9.6 %
LYMPHOCYTES: 1.5 (ref 0.78–3.73)
MEAN CORPUSCULAR HGB: 30.1 PG (ref 26–34)
MEAN CORPUSCULAR VOL: 91.4 FL (ref 80–100)
MEAN PLATELET VOLUME: 8.7 FL (ref 6.8–10.2)
MG (MAGNESIUM, SERUM: 1.7 MG/DL (ref 1.6–2.6)
MONOCYTE AUTOMATED: 4.5 %
MONOCYTES: 0.7 (ref 0.17–1)
NA (SODIUM, SERUM): 139 MMOL/L (ref 133–144)
NEUTROPHIL ABSOLUTE: 13.4 (ref 1.91–7.6)
NEUTROPHIL AUTOMATED: 84 %
PLATELET COUNT: 153 K/MM3 (ref 150–450)
RED BLOOD CELL COUNT: 3.81 M/MM3 (ref 3.63–5.04)
RED CELL DISTRIBUTIO: 15 % (ref 11.9–15.9)
WHITE BLOOD CELL COU: 15.9 K/MM3 (ref 4–11)

## 2024-02-12 LAB
*MEAN CORPUSCULAR HGB CONC: 33.4 G/DL (ref 32.5–35.8)
ALBUMIN, SERUM (ALB): 2.8 G/DL (ref 3.5–5.7)
ANION GAP: 9 MEQ/L (ref 6.2–14.7)
BASOPHIL AUTOMATED: 0.4 %
BASOPHILS: 0 (ref 0–0.14)
BLOOD UREA NITROGEN (BUN): 9 MG/DL (ref 7–25)
BUN/CREATININE RATIO: 17 (ref 7.4–23)
CALCIUM ALBUMIN ADJUSTED: 9.5 MG/DL (ref 8.6–10.3)
CALCIUM, SERUM: 8.5 MG/DL (ref 8.6–10.3)
CARBON DIOXIDE: 20 MEQ/L (ref 21–31)
CHLORIDE, SERUM: 107 MMOL/L (ref 98–107)
CLINDAMYCIN: >4
CREATININE: 0.53 MG/DL (ref 0.6–1.2)
EOSINOPHIL AUTOMATED: 3.3 %
EOSINOPHILS: 0.4 (ref 0–0.6)
EST GLOMERULAR FILTRATION RATE: > 60 ML/MIN
GLUCOSE: 101 MG/DL (ref 70–99)
GRAM STAIN: NORMAL
HEMATOCRIT: 36.8 % (ref 34.7–45.1)
HEMOGLOBIN: 12.3 GM/DL (ref 12–15.3)
K (POTASSIUM, SERUM): 3.7 MMOL/L (ref 3.5–5.2)
LYMPHOCYTE AUTOMATED: 10.7 %
LYMPHOCYTES: 1.3 (ref 0.78–3.73)
MEAN CORPUSCULAR HGB: 30.2 PG (ref 26–34)
MEAN CORPUSCULAR VOL: 90.3 FL (ref 80–100)
MEAN PLATELET VOLUME: 8.6 FL (ref 6.8–10.2)
MG (MAGNESIUM, SERUM: 1.8 MG/DL (ref 1.6–2.6)
MONOCYTE AUTOMATED: 6.4 %
MONOCYTES: 0.8 (ref 0.17–1)
NA (SODIUM, SERUM): 136 MMOL/L (ref 133–144)
NEUTROPHIL ABSOLUTE: 9.7 (ref 1.91–7.6)
NEUTROPHIL AUTOMATED: 79.2 %
OXACILLIN: >2
PLATELET COUNT: 159 K/MM3 (ref 150–450)
RED BLOOD CELL COUNT: 4.08 M/MM3 (ref 3.63–5.04)
RED CELL DISTRIBUTIO: 14.4 % (ref 11.9–15.9)
T4(THYROXINE), FREE REFLEX: 0.78 NG/DL (ref 0.55–1.6)
THYROID STIMULATING: 13.38 MIU/ML (ref 0.27–4.2)
TRIMETHOPRIM/SULFAMETHOXAZOLE: ABNORMAL
VANCOMYCIN SERPL-MCNC: 1 UG/ML
VANCOMYCIN TROUGH LD DATE: NORMAL
VANCOMYCIN TROUGH LD TIME: 1823
VANCOMYCIN TROUGH: 10.7 UG/ML (ref 10–20)
WHITE BLOOD CELL COU: 12.2 K/MM3 (ref 4–11)
WOUND CULTURE: NORMAL

## 2024-02-13 LAB
*MEAN CORPUSCULAR HGB CONC: 32.4 G/DL (ref 32.5–35.8)
ANAEROBIC CULTURE: NORMAL
ANAEROBIC CULTURE: NORMAL
ANION GAP: 13 MEQ/L (ref 6.2–14.7)
BASOPHIL AUTOMATED: 0.7 %
BASOPHILS: 0.1 (ref 0–0.14)
BLOOD UREA NITROGEN (BUN): 9 MG/DL (ref 7–25)
BUN/CREATININE RATIO: 15.8 (ref 7.4–23)
CALCIUM, SERUM: 8.6 MG/DL (ref 8.6–10.3)
CARBON DIOXIDE: 18 MEQ/L (ref 21–31)
CHLORIDE, SERUM: 108 MMOL/L (ref 98–107)
CREATININE: 0.57 MG/DL (ref 0.6–1.2)
EOSINOPHIL AUTOMATED: 3.3 %
EOSINOPHILS: 0.4 (ref 0–0.6)
EST GLOMERULAR FILTRATION RATE: > 60 ML/MIN
GLUCOSE: 111 MG/DL (ref 70–99)
HEMATOCRIT: 40.3 % (ref 34.7–45.1)
HEMOGLOBIN: 13.1 GM/DL (ref 12–15.3)
K (POTASSIUM, SERUM): 3.6 MMOL/L (ref 3.5–5.2)
LYMPHOCYTE AUTOMATED: 9 %
LYMPHOCYTES: 1.2 (ref 0.78–3.73)
MEAN CORPUSCULAR HGB: 29.9 PG (ref 26–34)
MEAN CORPUSCULAR VOL: 92.3 FL (ref 80–100)
MEAN PLATELET VOLUME: 8.5 FL (ref 6.8–10.2)
MONOCYTE AUTOMATED: 6.7 %
MONOCYTES: 0.9 (ref 0.17–1)
NA (SODIUM, SERUM): 139 MMOL/L (ref 133–144)
NEUTROPHIL ABSOLUTE: 10.5 (ref 1.91–7.6)
NEUTROPHIL AUTOMATED: 80.3 %
PLATELET COUNT: 176 K/MM3 (ref 150–450)
RED BLOOD CELL COUNT: 4.37 M/MM3 (ref 3.63–5.04)
RED CELL DISTRIBUTIO: 15 % (ref 11.9–15.9)
WHITE BLOOD CELL COU: 13.1 K/MM3 (ref 4–11)

## 2024-02-15 LAB
BLOOD CULTURE: NORMAL
BLOOD CULTURE: NORMAL

## (undated) NOTE — LETTER
10/29/2019              Cherry Peoples        [address] [place] [address]         To Whom it may concern: This is to certify that Cherry Sales had an appointment on 10/29/2019 at 2:26 PM with Campbell Aaron DO.   I have determin

## (undated) NOTE — IP AVS SNAPSHOT
[address]            (For Outpatient Use Only) Initial Admit Date: 11/30/2021   Inpt/Obs Admit Date: Inpt: 12/1/21 / Obs: 11/30/21   Discharge Date:    Shelley Galvez:  [de-identified]   MRN: [de-identified]   CSN: [id number]   CEID: [id number] Payor:  Plan:    Group Number:  Insurance Type:    Subscriber Name:  Subscriber :    Subscriber ID:  Pt Rel to Subscriber:    Hospital Account Financial Class: Medicare    December 3, 2021

## (undated) NOTE — ED AVS SNAPSHOT
Robin Prater   MRN: [id number]    Department:  [facility] Emergency Department   Date of Visit:  4/15/2019           Disclosure     Insurance plans vary and the physician(s) referred by the ER may not be covered by your plan.  Please contact y tell this physician (or your personal doctor if your instructions are to return to your personal doctor) about any new or lasting problems. The primary care or specialist physician will see patients referred from the [facility] Emergency Department.  Shelton Lombard

## (undated) NOTE — IP AVS SNAPSHOT
Patient Demographics     Address  [address] [place] [address] Phone  [phone] (Home) *Preferred*  [phone] [facility]) E-mail Address  [email or web address]      Emergency Contact(s)     Name Relation Home Work [place], [place] ]   [    ]   [    ]     sertraline 50 MG Tabs  Commonly known as: ZOLOFT      Take 1 tablet (50 mg total) by mouth daily. González Jacome DO   [    ]   [    ]   [    ]   [    ]     Vitamin D 50 MCG (2000 UT) Caps      Take 2,000 Units by mouth daily. created for panel order Stool Culture W/Shigatoxin.   Procedure                               Abnormality         Status                     ---------                               -----------         ------                     Stool Culture[[id number]] Enterotoxigenic E. Coli Pcr Negative     Shig Txn 1/2 Prod E. Coli Pcr Negative     Shig/Enteroinvasive E.  Coli Pcr Negative     Cryptosporidium Pcr Negative     Cyclospora Cayetanensis Pcr Negative     Entamoeba Histolytica Pcr Negative     Giardia Lambli Patient Status:  Observation    10/17/1940 MRN [id number]   [facility] 3NE-A Attending Claritza Adhikari MD   Hosp Day # 0 PCP Charisma Tomas DO     Chief Complaint: abd pain     History of Present Illness: Roland Hercules is a 80year old Bilateral 2015    Procedure: TRANSFORAMINAL EPIDURAL STEROID INJECTION;  Surgeon: Missy Sam MD;  Location: [facility]   •      • PATIENT DOCUMENTED NOT TO HAVE EXPERIENCED ANY OF THE FOLLOWING EVENTS Bilateral 2015    Procedure: Oral Tab, Take 1 tablet (10 mg total) by mouth once daily. , Disp: 30 tablet, Rfl: 2  Donepezil HCl 5 MG Oral Tab, Take 1 tablet (5 mg total) by mouth nightly., Disp: 30 tablet, Rfl: 2  Cholecalciferol (VITAMIN D) 2000 UNITS Oral Cap, Take 2,000 Units by mo mL/min (A) (based on SCr of 1.13 mg/dL (H)). No results for input(s): PTP, INR in the last 168 hours.     COVID-19 Lab Results    COVID-19  Lab Results   Component Value Date    COVID19 Not Detected 11/30/2021    COVID19 Not Detected 08/12/2020    COVID1 Shilo Patient Status:  Observation    10/17/1940 MRN [id number]   [facility] 3NE-A Attending Phoebe Loving MD   Hosp Day # 0 PCP Nubia Adler DO     Chief Complaint: abd pain     History of Present Illness: Robin Prater is a 9 SINGLE LEVEL Bilateral 2015    Procedure: TRANSFORAMINAL EPIDURAL STEROID INJECTION;  Surgeon: Ashish Love MD;  Location: [facility]   •      • PATIENT DOCUMENTED NOT TO HAVE EXPERIENCED ANY OF THE FOLLOWING EVENTS Bilateral 2015 2  Quinapril HCl 10 MG Oral Tab, Take 1 tablet (10 mg total) by mouth once daily. , Disp: 30 tablet, Rfl: 2  Donepezil HCl 5 MG Oral Tab, Take 1 tablet (5 mg total) by mouth nightly., Disp: 30 tablet, Rfl: 2  Cholecalciferol (VITAMIN D) 2000 UNITS Oral Cap, Creatinine Clearance: 36.3 mL/min (A) (based on SCr of 1.13 mg/dL (H)). No results for input(s): PTP, INR in the last 168 hours.     COVID-19 Lab Results    COVID-19  Lab Results   Component Value Date    COVID19 Not Detected 11/30/2021    COVID19 Not [place] [facility]                       Gastroenterology [address] [facility]    Shikha Oneill Patient Status:  Observation    10/17/1940 MRN [id number]   [facility]    Procedure: TRANSFORAMINAL EPIDURAL STEROID INJECTION;  Surgeon: Bentley Aguilar MD;  Location: [facility]   • INJECTION, ANESTHETIC/STEROID, TRANSFORAMINAL EPIDURAL; LUMBAR/SACRAL, SINGLE LEVEL Bilateral 11/16/2015    Procedure: [facility] Medications: aspirin chewable tab 324 mg, 324 mg, Oral, Once  [COMPLETED] sodium chloride 0.9% IV bolus 1,905 mL, 30 mL/kg, Intravenous, Once  [COMPLETED] iohexol (OMNIPAQUE) 350 MG/ML injection 100 mL, 100 mL, Intravenous, ONCE PRN  donepezil (FRANCESCA sinus congestion, tinnitus             Neurologic: + dementia     PE: BP 94/57 (BP Location: Left arm)   Pulse 67   Temp 97.4 °F (36.3 °C) (Oral)   Resp 18   Ht 5' 5\" (1.651 m)   Wt 140 lb (63.5 kg)   LMP 08/28/1970 (LMP Unknown)   SpO2 99%   BMI 23.30 kg Recent Labs   Lab 11/30/21  1607 11/30/21  1759   ALT 20  --    AST  --  20       Imaging:     PROCEDURE:  CT ABDOMEN PELVIS IV CONTRAST, NO ORAL (ER)       COMPARISON:  [facility] , XR, XR CHEST AP PORTABLE  (CPT=71045), 11/03/2019, 8:30 AM.       INDIC dilatation.  Small amount of free fluid in the   pelvis.  No free air.  Sigmoid colonic diverticulosis. ABDOMINAL WALL:  Small fat containing umbilical hernia.    PELVIC ORGANS:  Unremarkable urinary bladder.  Vascular calcifications along the uterus. Park Judy start Zosyn given leukocytosis, lactic acidosis, and hypotension   3. Pain management per PCP recommendations; Zofran 4 mg IV q 6 PRN nausea  4. Trial of a clear liquid diet encouraging smaller more frequent meals   5.  Monitor for overt GI bleeding--transf 11/30/21-  CONCLUSION:       1. There is moderate colonic wall thickening extending from the transverse colon down through the rectum that is concerning for nonspecific colitis. Clinical correlation recommended.       2. There is mild gastric wall thickeni Location: [facility]   • INJECTION, ANESTHETIC/STEROID, TRANSFORAMINAL EPIDURAL; LUMBAR/SACRAL, SINGLE LEVEL Bilateral 12/14/2015    Procedure: TRANSFORAMINAL EPIDURAL STEROID INJECTION;  Surgeon: Jeremy Belle MD;  Location: [facility]   • Karissa cassandra (brief)\"    OBJECTIVE  Precautions: Bed/chair alarm       WEIGHT BEARING RESTRICTION                PAIN ASSESSMENT  Ratin         RANGE OF MOTION AND STRENGTH ASSESSMENT  Upper extremity ROM and strength are-see OT eval    Lower extremity ROM is with the bathroom. Supine/sit c CGA to the EOB. Pt able to scoot and reposition c CGA, no device, refused RW. PT carefully donned gait belt d/t this agitating the pt. Pt able to amb several ft into the restroom and completed a toilet t/f c min A-1 HHA.  Pt requi following goals . ..     Patient was able to transfer At previous, functional level   Patient able to ambulate on level surfaces At previous, functional level     *PPE worn was surgical mask, gloves, goggles; pt donned mask during amb       Physical Therapy emily Olson    Interventions:  - GI culture  -Medication per MD   Patient/Family Short Term Goal     Interdisciplinary Progressing    Description: Patient's Short Term Goal: Discontinue restraints    Interventions:   - Close monitoring  Reorientation to

## (undated) NOTE — LETTER
[place] [address]  [address] [facility]  [phone]        Dear Nubia Adler, ,      I had the pleasure of seeing your patient, Robin Prater on 5/8/2019. Below please find a summary of our visit. Impression early stages of Alzheimer's disease. I strongly recommended that she needs a medical, financial power of . Recommended 's test and she took the order for [facility].   We have no further driving until passes driving test.  Strongly

## (undated) NOTE — MR AVS SNAPSHOT
[facility] SPECIALTY [facility] [place] [address] [address] [address]  [phone]               Thank you for choosing us for your health care visit with Ted Lakhani MD.  We are glad to serve you and happy to provide you with this summary of y TYLENOL 325 MG Tabs   Generic drug:  acetaminophen   Take 325 mg by mouth every 6 (six) hours as needed for Pain. Vitamin D 2000 units Caps   Take 2,000 Units by mouth daily.                 Where to Get Your Medications      These medicat

## (undated) NOTE — IP AVS SNAPSHOT
Patient Demographics     Address  [address] [place] [address] Phone  [phone] (Home) *Preferred*  [phone] [facility]) E-mail Address  [email or web address]      Emergency Contact(s)     Name Relation Home Work [facility] [id number] 0.9% NaCl infusion 08/12/20 0629 New Bag      [id number] Donepezil HCl (ARICEPT) tab 5 mg 08/11/20 2003 Given      [id number] Levothyroxine Sodium (SYNTHROID) tab 100 mcg 08/12/20 0629 Given      [id number] Sertraline HCl (ZOLOFT) tab 50 mg 08/12/2 H&P signed by Jolene Fletcher MD at 8/8/2020  2:48 PM  Version 1 of 1    Author:  Jolene Fletcher MD Service:  Siria Graft Type:  Physician    Filed:  8/8/2020  2:48 PM Date of Service:  8/8/2020  2:40 PM Status:  Signed    :  Jolene Fletcher MD (Physician • TRANSFORAMINAL EPIDURAL STEROID INJECTION Bilateral 12/14/2015    Performed by Deny Sánchez MD at [address] [place] [address]   • TRANSFORAMINAL EPIDURAL STEROID INJECTION Bilateral 11/16/2015    Performed by Deny Sánchez MD at [address] [place] [address] HEENT: Normocephalic atraumatic. Moist mucous membranes. EOM-I. PERRLA. Anicteric. Neck: No lymphadenopathy. No JVD. No carotid bruits. Respiratory: Clear to auscultation bilaterally. No wheezes. No rhonchi.   Cardiovascular: S1, S2. Regular rate and rhyt DK.1 Arin Melendez MD on 8/8/2020  2:40 PM                     D/C Summary    No notes of this type exist for this encounter.      Physical Therapy Notes (last 72 hours) (Notes from 8/9/2020 12:30 PM through 8/12/2020 12:30 PM)    No notes of this type e

## (undated) NOTE — IP AVS SNAPSHOT
[address]            (For Outpatient Use Only) Initial Admit Date: 8/8/2020   Inpt/Obs Admit Date: Inpt: N/A / Obs: 08/08/20   Discharge Date:    Dianne Medrano:  [de-identified]   MRN: [de-identified]   CSN: [id number]   CEID: [id number] Payor:  Plan:    Group Number:  Insurance Type:    Subscriber Name:  Subscriber :    Subscriber ID:  Pt Rel to Subscriber:    Hospital Account Financial Class: Medicare    2020

## (undated) NOTE — LETTER
03/30/19        [address]  [place] [facility] [address]      Dear Arlette Cerrato,    [facility] records indicate that you have outstanding lab work and or testing that was ordered for you and has not yet been completed:  Orders Placed This Encounter

## (undated) NOTE — MR AVS SNAPSHOT
[facility] SPECIALTY [facility] [place] [address] [address] [address] [phone]               Thank you for choosing us for your health care visit with Lisy Garcia DO.   We are glad to serve you and happy to provide you with this summary of These medications were sent to [address] [facility] [address] [phone], [phone]  [address], [address]     Phone:  [phone]    - Oxybutynin Chloride ER 10 MG Tb24            Results of Recent Testing increments are effective and add up over the week   2 ½ hours per week – spread out over time Use a taras to keep you motivated   Don’t forget strength training with weights and resistance Set goals and track your progress   You don’t need to join a gym.